# Patient Record
Sex: MALE | Race: WHITE | NOT HISPANIC OR LATINO | Employment: FULL TIME | URBAN - METROPOLITAN AREA
[De-identification: names, ages, dates, MRNs, and addresses within clinical notes are randomized per-mention and may not be internally consistent; named-entity substitution may affect disease eponyms.]

---

## 2017-07-22 ENCOUNTER — APPOINTMENT (OUTPATIENT)
Dept: LAB | Age: 22
End: 2017-07-22
Attending: PREVENTIVE MEDICINE

## 2017-07-22 ENCOUNTER — TRANSCRIBE ORDERS (OUTPATIENT)
Dept: ADMINISTRATIVE | Age: 22
End: 2017-07-22

## 2017-07-22 DIAGNOSIS — Z00.8 HEALTH EXAMINATION IN POPULATION SURVEY: ICD-10-CM

## 2017-07-22 DIAGNOSIS — Z00.8 HEALTH EXAMINATION IN POPULATION SURVEY: Primary | ICD-10-CM

## 2017-07-22 PROCEDURE — 86480 TB TEST CELL IMMUN MEASURE: CPT

## 2017-07-22 PROCEDURE — 36415 COLL VENOUS BLD VENIPUNCTURE: CPT

## 2017-07-25 LAB
ANNOTATION COMMENT IMP: NORMAL
GAMMA INTERFERON BACKGROUND BLD IA-ACNC: 0.04 IU/ML
M TB IFN-G BLD-IMP: NEGATIVE
M TB IFN-G CD4+ BCKGRND COR BLD-ACNC: 0 IU/ML
M TB IFN-G CD4+ T-CELLS BLD-ACNC: 0.04 IU/ML
MITOGEN IGNF BLD-ACNC: 9.4 IU/ML
QUANTIFERON-TB GOLD IN TUBE: NORMAL
SERVICE CMNT-IMP: NORMAL

## 2021-01-09 ENCOUNTER — IMMUNIZATIONS (OUTPATIENT)
Dept: FAMILY MEDICINE CLINIC | Facility: HOSPITAL | Age: 26
End: 2021-01-09

## 2021-01-09 DIAGNOSIS — Z23 ENCOUNTER FOR IMMUNIZATION: ICD-10-CM

## 2021-01-09 PROCEDURE — 0011A SARS-COV-2 / COVID-19 MRNA VACCINE (MODERNA) 100 MCG: CPT

## 2021-01-09 PROCEDURE — 91301 SARS-COV-2 / COVID-19 MRNA VACCINE (MODERNA) 100 MCG: CPT

## 2021-02-04 ENCOUNTER — IMMUNIZATIONS (OUTPATIENT)
Dept: FAMILY MEDICINE CLINIC | Facility: HOSPITAL | Age: 26
End: 2021-02-04

## 2021-02-04 DIAGNOSIS — Z23 ENCOUNTER FOR IMMUNIZATION: Primary | ICD-10-CM

## 2021-02-04 PROCEDURE — 0012A SARS-COV-2 / COVID-19 MRNA VACCINE (MODERNA) 100 MCG: CPT

## 2021-02-04 PROCEDURE — 91301 SARS-COV-2 / COVID-19 MRNA VACCINE (MODERNA) 100 MCG: CPT

## 2021-02-16 ENCOUNTER — TELEMEDICINE (OUTPATIENT)
Dept: FAMILY MEDICINE CLINIC | Facility: CLINIC | Age: 26
End: 2021-02-16
Payer: COMMERCIAL

## 2021-02-16 DIAGNOSIS — Z13.220 SCREENING FOR HYPERLIPIDEMIA: ICD-10-CM

## 2021-02-16 DIAGNOSIS — K58.9 IRRITABLE BOWEL SYNDROME, UNSPECIFIED TYPE: ICD-10-CM

## 2021-02-16 DIAGNOSIS — R11.0 NAUSEA: ICD-10-CM

## 2021-02-16 DIAGNOSIS — R10.13 EPIGASTRIC PAIN: Primary | ICD-10-CM

## 2021-02-16 DIAGNOSIS — R10.84 GENERALIZED ABDOMINAL PAIN: ICD-10-CM

## 2021-02-16 PROCEDURE — 99214 OFFICE O/P EST MOD 30 MIN: CPT | Performed by: FAMILY MEDICINE

## 2021-02-16 NOTE — PATIENT INSTRUCTIONS
Here for nausea and mid abdominal to epigastric abdominal pain, check labs and rec also consult with GI for possible IBS  Rec calling if fever or increasing abdominal pain  Await results

## 2021-02-16 NOTE — PROGRESS NOTES
Virtual Regular Visit      Assessment/Plan:    Problem List Items Addressed This Visit     None      Visit Diagnoses     Epigastric pain    -  Primary    Relevant Orders    Comprehensive metabolic panel    CBC and differential    Ambulatory referral to Gastroenterology    Celiac Disease Antibody Profile    Amylase    Lipase    UA w Reflex to Microscopic w Reflex to Culture -Lab Collect    Generalized abdominal pain        Relevant Orders    Comprehensive metabolic panel    CBC and differential    Ambulatory referral to Gastroenterology    Celiac Disease Antibody Profile    Amylase    Lipase    UA w Reflex to Microscopic w Reflex to Culture -Lab Collect    Irritable bowel syndrome, unspecified type        Relevant Orders    Comprehensive metabolic panel    CBC and differential    Ambulatory referral to Gastroenterology    Celiac Disease Antibody Profile    Amylase    Lipase    UA w Reflex to Microscopic w Reflex to Culture -Lab Collect    Nausea        Relevant Orders    Comprehensive metabolic panel    CBC and differential    Ambulatory referral to Gastroenterology    Celiac Disease Antibody Profile    Amylase    Lipase    UA w Reflex to Microscopic w Reflex to Culture -Lab Collect    Screening for hyperlipidemia        Relevant Orders    Comprehensive metabolic panel    Lipid Panel with Direct LDL reflex               Reason for visit is   Chief Complaint   Patient presents with    Virtual Regular Visit        Encounter provider Kieran Valles DO    Provider located at [address] [place] [id number]      Recent Visits  No visits were found meeting these conditions     Showing recent visits within past 7 days and meeting all other requirements     Today's Visits  Date Type Provider Dept   02/16/21 Telemedicine Kieran Valles DO [facility] today's visits and meeting all other requirements     Future Appointments  No visits were found meeting these conditions  Showing future appointments within next 150 days and meeting all other requirements        The patient was identified by name and date of birth  Jaqueline Dominguez was informed that this is a telemedicine visit and that the visit is being conducted through [facility] and patient was informed that this is a secure, HIPAA-compliant platform  He agrees to proceed     My office door was closed  No one else was in the room  He acknowledged consent and understanding of privacy and security of the video platform  The patient has agreed to participate and understands they can discontinue the visit at any time  Patient is aware this is a billable service  Subjective  Jaqueline Dominguez is a 31 y o  male here for nausea and abdominal pain        TKJ/[phone] NP, Stomach pain/ concerns with being allergic to anything, works at [employer] and has no PCP and is an  at [employer]  Generally healthy and on no meds  Possible nausea into evening and hasn't found anything to cause it  Has hx of heart burn in past  Not frequent but has been occurring 3 times per week with nausea, no diarrhea and has no blood in stools  No vomiting  Has some stress at work and just got a dog which can add stress  No fever or pain radiating to back  He drinks a bottle of wine in a month  No other complaints  No past medical history on file  No past surgical history on file  No current outpatient medications on file  No current facility-administered medications for this visit  Not on File    Review of Systems   Constitutional: Negative  Negative for fever  HENT: Negative  Eyes: Negative  Cardiovascular: Negative  Gastrointestinal: Positive for abdominal pain and nausea  Negative for blood in stool, constipation, diarrhea and vomiting  Endocrine: Negative  Genitourinary: Negative  Musculoskeletal: Negative  Skin: Negative      Allergic/Immunologic: Negative  Neurological: Negative  Hematological: Negative  Psychiatric/Behavioral: Negative  Video Exam    There were no vitals filed for this visit  Physical Exam  Constitutional:       Appearance: Normal appearance  HENT:      Head: Normocephalic and atraumatic  Eyes:      Conjunctiva/sclera: Conjunctivae normal    Pulmonary:      Effort: Pulmonary effort is normal  No respiratory distress  Skin:     Coloration: Skin is not pale  Neurological:      General: No focal deficit present  Mental Status: He is alert and oriented to person, place, and time  Psychiatric:         Mood and Affect: Mood normal          Behavior: Behavior normal          Thought Content: Thought content normal          Judgment: Judgment normal           I spent 30 minutes directly with the patient during this visit    Patient Instructions   Here for nausea and mid abdominal to epigastric abdominal pain, check labs and rec also consult with GI for possible IBS  Rec calling if fever or increasing abdominal pain  Await results  VIRTUAL VISIT DISCLAIMER    Natividad Pak acknowledges that he has consented to an online visit or consultation  He understands that the online visit is based solely on information provided by him, and that, in the absence of a face-to-face physical evaluation by the physician, the diagnosis he receives is both limited and provisional in terms of accuracy and completeness  This is not intended to replace a full medical face-to-face evaluation by the physician  Natividad Pak understands and accepts these terms

## 2021-03-02 ENCOUNTER — LAB (OUTPATIENT)
Dept: LAB | Facility: HOSPITAL | Age: 26
End: 2021-03-02
Payer: COMMERCIAL

## 2021-03-02 DIAGNOSIS — R11.0 NAUSEA: ICD-10-CM

## 2021-03-02 DIAGNOSIS — R10.13 EPIGASTRIC PAIN: ICD-10-CM

## 2021-03-02 DIAGNOSIS — R10.84 GENERALIZED ABDOMINAL PAIN: ICD-10-CM

## 2021-03-02 DIAGNOSIS — K58.9 IRRITABLE BOWEL SYNDROME, UNSPECIFIED TYPE: ICD-10-CM

## 2021-03-02 DIAGNOSIS — Z13.220 SCREENING FOR HYPERLIPIDEMIA: ICD-10-CM

## 2021-03-02 LAB
ALBUMIN SERPL BCP-MCNC: 4 G/DL (ref 3.5–5)
ALP SERPL-CCNC: 53 U/L (ref 46–116)
ALT SERPL W P-5'-P-CCNC: 38 U/L (ref 12–78)
AMYLASE SERPL-CCNC: 53 IU/L (ref 25–115)
ANION GAP SERPL CALCULATED.3IONS-SCNC: 7 MMOL/L (ref 4–13)
AST SERPL W P-5'-P-CCNC: 22 U/L (ref 5–45)
BASOPHILS # BLD AUTO: 0.04 THOUSANDS/ΜL (ref 0–0.1)
BASOPHILS NFR BLD AUTO: 1 % (ref 0–1)
BILIRUB SERPL-MCNC: 0.49 MG/DL (ref 0.2–1)
BUN SERPL-MCNC: 15 MG/DL (ref 5–25)
CALCIUM SERPL-MCNC: 9.1 MG/DL (ref 8.3–10.1)
CHLORIDE SERPL-SCNC: 104 MMOL/L (ref 100–108)
CO2 SERPL-SCNC: 25 MMOL/L (ref 21–32)
CREAT SERPL-MCNC: 0.91 MG/DL (ref 0.6–1.3)
EOSINOPHIL # BLD AUTO: 0.21 THOUSAND/ΜL (ref 0–0.61)
EOSINOPHIL NFR BLD AUTO: 3 % (ref 0–6)
ERYTHROCYTE [DISTWIDTH] IN BLOOD BY AUTOMATED COUNT: 12.7 % (ref 11.6–15.1)
GFR SERPL CREATININE-BSD FRML MDRD: 116 ML/MIN/1.73SQ M
GLUCOSE SERPL-MCNC: 95 MG/DL (ref 65–140)
HCT VFR BLD AUTO: 46.1 % (ref 36.5–49.3)
HGB BLD-MCNC: 15.4 G/DL (ref 12–17)
IMM GRANULOCYTES # BLD AUTO: 0.01 THOUSAND/UL (ref 0–0.2)
IMM GRANULOCYTES NFR BLD AUTO: 0 % (ref 0–2)
LIPASE SERPL-CCNC: 86 U/L (ref 73–393)
LYMPHOCYTES # BLD AUTO: 1.95 THOUSANDS/ΜL (ref 0.6–4.47)
LYMPHOCYTES NFR BLD AUTO: 31 % (ref 14–44)
MCH RBC QN AUTO: 30.4 PG (ref 26.8–34.3)
MCHC RBC AUTO-ENTMCNC: 33.4 G/DL (ref 31.4–37.4)
MCV RBC AUTO: 91 FL (ref 82–98)
MONOCYTES # BLD AUTO: 0.44 THOUSAND/ΜL (ref 0.17–1.22)
MONOCYTES NFR BLD AUTO: 7 % (ref 4–12)
NEUTROPHILS # BLD AUTO: 3.66 THOUSANDS/ΜL (ref 1.85–7.62)
NEUTS SEG NFR BLD AUTO: 58 % (ref 43–75)
NRBC BLD AUTO-RTO: 0 /100 WBCS
PLATELET # BLD AUTO: 246 THOUSANDS/UL (ref 149–390)
PMV BLD AUTO: 9.4 FL (ref 8.9–12.7)
POTASSIUM SERPL-SCNC: 4 MMOL/L (ref 3.5–5.3)
PROT SERPL-MCNC: 7.4 G/DL (ref 6.4–8.2)
RBC # BLD AUTO: 5.06 MILLION/UL (ref 3.88–5.62)
SODIUM SERPL-SCNC: 136 MMOL/L (ref 136–145)
WBC # BLD AUTO: 6.31 THOUSAND/UL (ref 4.31–10.16)

## 2021-03-02 PROCEDURE — 86255 FLUORESCENT ANTIBODY SCREEN: CPT

## 2021-03-02 PROCEDURE — 83516 IMMUNOASSAY NONANTIBODY: CPT

## 2021-03-02 PROCEDURE — 82784 ASSAY IGA/IGD/IGG/IGM EACH: CPT

## 2021-03-02 PROCEDURE — 82150 ASSAY OF AMYLASE: CPT

## 2021-03-02 PROCEDURE — 83690 ASSAY OF LIPASE: CPT

## 2021-03-02 PROCEDURE — 36415 COLL VENOUS BLD VENIPUNCTURE: CPT

## 2021-03-02 PROCEDURE — 80053 COMPREHEN METABOLIC PANEL: CPT

## 2021-03-02 PROCEDURE — 85025 COMPLETE CBC W/AUTO DIFF WBC: CPT

## 2021-03-03 ENCOUNTER — APPOINTMENT (OUTPATIENT)
Dept: LAB | Facility: HOSPITAL | Age: 26
End: 2021-03-03
Payer: COMMERCIAL

## 2021-03-03 LAB
BILIRUB UR QL STRIP: NEGATIVE
CHOLEST SERPL-MCNC: 207 MG/DL (ref 50–200)
CLARITY UR: CLEAR
COLOR UR: YELLOW
ENDOMYSIUM IGA SER QL: NEGATIVE
GLIADIN PEPTIDE IGA SER-ACNC: 6 UNITS (ref 0–19)
GLIADIN PEPTIDE IGG SER-ACNC: 3 UNITS (ref 0–19)
GLUCOSE UR STRIP-MCNC: NEGATIVE MG/DL
HDLC SERPL-MCNC: 44 MG/DL
HGB UR QL STRIP.AUTO: NEGATIVE
IGA SERPL-MCNC: 272 MG/DL (ref 90–386)
KETONES UR STRIP-MCNC: NEGATIVE MG/DL
LDLC SERPL CALC-MCNC: 143 MG/DL (ref 0–100)
LEUKOCYTE ESTERASE UR QL STRIP: NEGATIVE
NITRITE UR QL STRIP: NEGATIVE
PH UR STRIP.AUTO: 6.5 [PH]
PROT UR STRIP-MCNC: NEGATIVE MG/DL
SP GR UR STRIP.AUTO: 1.01 (ref 1–1.03)
TRIGL SERPL-MCNC: 100 MG/DL
TTG IGA SER-ACNC: <2 U/ML (ref 0–3)
TTG IGG SER-ACNC: 8 U/ML (ref 0–5)
UROBILINOGEN UR QL STRIP.AUTO: 0.2 E.U./DL

## 2021-03-03 PROCEDURE — 80061 LIPID PANEL: CPT

## 2021-03-03 PROCEDURE — 36415 COLL VENOUS BLD VENIPUNCTURE: CPT

## 2021-03-03 PROCEDURE — 81003 URINALYSIS AUTO W/O SCOPE: CPT | Performed by: FAMILY MEDICINE

## 2021-03-29 ENCOUNTER — CONSULT (OUTPATIENT)
Dept: GASTROENTEROLOGY | Facility: CLINIC | Age: 26
End: 2021-03-29
Payer: COMMERCIAL

## 2021-03-29 VITALS
TEMPERATURE: 98.7 F | BODY MASS INDEX: 25.84 KG/M2 | SYSTOLIC BLOOD PRESSURE: 128 MMHG | WEIGHT: 195 LBS | HEART RATE: 89 BPM | HEIGHT: 73 IN | DIASTOLIC BLOOD PRESSURE: 90 MMHG

## 2021-03-29 DIAGNOSIS — R10.84 GENERALIZED ABDOMINAL PAIN: ICD-10-CM

## 2021-03-29 DIAGNOSIS — R10.13 EPIGASTRIC PAIN: ICD-10-CM

## 2021-03-29 DIAGNOSIS — R11.0 NAUSEA: ICD-10-CM

## 2021-03-29 DIAGNOSIS — K58.9 IRRITABLE BOWEL SYNDROME, UNSPECIFIED TYPE: ICD-10-CM

## 2021-03-29 PROCEDURE — 99204 OFFICE O/P NEW MOD 45 MIN: CPT | Performed by: PHYSICIAN ASSISTANT

## 2021-03-29 RX ORDER — CHLORAL HYDRATE 500 MG
CAPSULE ORAL
COMMUNITY
End: 2022-02-18

## 2021-03-29 NOTE — PROGRESS NOTES
[facility] - Outpatient Follow-up Note  Brooke Waller 31 y o  male MRN: [id number]  Encounter: [id number]          ASSESSMENT AND PLAN:      Diagnoses and all orders for this visit:    1  Nausea - resolved  -     Ambulatory referral to Gastroenterology  2  Irritable bowel syndrome, unspecified type  3  Generalized abdominal pain  -     Ambulatory referral to Gastroenterology    Patient presenting with mild symptoms of nausea and occasional diarrhea which have improved with adequate hydration and change to a higher fiber diet  No red flag symptoms of unintentional weight loss, melena or hematochezia  No family history of GI malignancy  His celiac antibody profile showed elevated TTGA IgG but normal IGA and TTGA IgA which would be diagnostic for celiac  Discussed the option of an EGD for definitive rule out of celiac disease, but suspect this will be negative given his labs  For residual symptoms of occasional diarrhea, discussed lower FODMAP diet and the option to add a daily probiotic  If symptoms persist, or worsen, could consider endoscopic workup in the future  Will plan for conservative management for now  Patient is in agreement with this plan and all questions were answered  ______________________________________________________________________    SUBJECTIVE:  Brooke Waller is a 31year old male with no significant medical history who presents to the GI office on referral from his PCP for nausea and abdominal pain  He states that these symptoms have been present on-and-off for several months  He has made some changes to his diet from a higher-protein diet to including high fiber foods and drinking a gallon of water daily  His symptoms have improved with this  He notes that he will occasionally still feel some nausea and lower abdominal discomfort after eating at restaurants  At times, triggers cannot be easily identified  He is consuming less dairy    He drinks alcohol socially (1-2 drinks less than 1x per week)  He is a non-smoker  He denies melena or hematochezia  He has lost 25-30lbs intentionally over the past 1 year with diet and exercise changes  No family history of colon cancer, though his mother was recently diagnosed with uterine cancer - no other GI malignancies in his maternal family history  His father recently completed a colonoscopy and he reports this was normal     He denies symptoms of heartburn, regurgitation, dysphagia or vomiting  No fevers, chills, chest pain, cough or SOB  He works as an  for [employer]  Labs as ordered by his PCP showed normal CBC and CMP  Lipase and amylase also WNL  A celiac antibody profile was ordered  IGA was within normal range and TTGA IGA was normal   He had a mildly elevated IgG TTGA which is non-specific  REVIEW OF SYSTEMS IS OTHERWISE NEGATIVE  Historical Information   History reviewed  No pertinent past medical history  History reviewed  No pertinent surgical history  Social History   Social History     Substance and Sexual Activity   Alcohol Use Never    Frequency: Never     Social History     Substance and Sexual Activity   Drug Use Never     Social History     Tobacco Use   Smoking Status Never Smoker   Smokeless Tobacco Never Used     History reviewed  No pertinent family history  Meds/Allergies       Current Outpatient Medications:     Multiple Vitamins-Minerals (DAILY [facility] FORMULA PO)    Omega-3 1000 MG CAPS    No Known Allergies        Objective     Blood pressure 128/90, pulse 89, temperature 98 7 °F (37 1 °C), temperature source Tympanic, height 6' 1" (1 854 m), weight 88 5 kg (195 lb)  Body mass index is 25 73 kg/m²        PHYSICAL EXAM:      General Appearance:   Alert, cooperative, no distress   HEENT:   Normocephalic, atraumatic, sclera anicteric      Neck:  Supple, symmetrical, no lymphadenopathy, trachea midline   Lungs:   Clear to auscultation bilaterally; no rales, rhonchi or wheezing; respirations unlabored    Heart[de-identified]   Regular rate and rhythm; no murmur, rub, or gallop     Abdomen:   Soft, non-tender without rebound or guarding, non-distended; positive bowel sounds; no masses, no organomegaly    Genitalia:   Deferred    Rectal:   Deferred    Extremities:  No cyanosis, clubbing or edema    Pulses:  2+ and symmetric    Skin:  No jaundice, rashes, or lesions            Wendy Soler PA-C

## 2021-10-06 ENCOUNTER — OFFICE VISIT (OUTPATIENT)
Dept: FAMILY MEDICINE CLINIC | Facility: CLINIC | Age: 26
End: 2021-10-06
Payer: COMMERCIAL

## 2021-10-06 VITALS
SYSTOLIC BLOOD PRESSURE: 118 MMHG | WEIGHT: 196 LBS | DIASTOLIC BLOOD PRESSURE: 72 MMHG | BODY MASS INDEX: 25.98 KG/M2 | TEMPERATURE: 96.7 F | HEART RATE: 66 BPM | HEIGHT: 73 IN | OXYGEN SATURATION: 98 %

## 2021-10-06 DIAGNOSIS — F41.9 ANXIETY: ICD-10-CM

## 2021-10-06 DIAGNOSIS — E78.5 HYPERLIPIDEMIA, UNSPECIFIED HYPERLIPIDEMIA TYPE: ICD-10-CM

## 2021-10-06 DIAGNOSIS — E66.3 OVERWEIGHT (BMI 25.0-29.9): ICD-10-CM

## 2021-10-06 DIAGNOSIS — Z00.00 HEALTH CARE MAINTENANCE: Primary | ICD-10-CM

## 2021-10-06 DIAGNOSIS — Z13.220 SCREENING FOR HYPERLIPIDEMIA: ICD-10-CM

## 2021-10-06 PROCEDURE — 99395 PREV VISIT EST AGE 18-39: CPT | Performed by: FAMILY MEDICINE

## 2021-10-06 RX ORDER — SERTRALINE HYDROCHLORIDE 25 MG/1
25 TABLET, FILM COATED ORAL DAILY
Qty: 7 TABLET | Refills: 0 | Status: SHIPPED | OUTPATIENT
Start: 2021-10-06 | End: 2021-11-17

## 2021-10-07 ENCOUNTER — APPOINTMENT (OUTPATIENT)
Dept: LAB | Facility: HOSPITAL | Age: 26
End: 2021-10-07
Payer: COMMERCIAL

## 2021-10-07 DIAGNOSIS — E78.5 HYPERLIPIDEMIA, UNSPECIFIED HYPERLIPIDEMIA TYPE: ICD-10-CM

## 2021-10-07 DIAGNOSIS — E66.3 OVERWEIGHT (BMI 25.0-29.9): ICD-10-CM

## 2021-10-07 DIAGNOSIS — Z00.00 HEALTH CARE MAINTENANCE: ICD-10-CM

## 2021-10-07 DIAGNOSIS — Z13.220 SCREENING FOR HYPERLIPIDEMIA: ICD-10-CM

## 2021-10-07 DIAGNOSIS — F41.9 ANXIETY: ICD-10-CM

## 2021-10-07 LAB
ALBUMIN SERPL BCP-MCNC: 3.9 G/DL (ref 3.5–5)
ALP SERPL-CCNC: 47 U/L (ref 46–116)
ALT SERPL W P-5'-P-CCNC: 33 U/L (ref 12–78)
ANION GAP SERPL CALCULATED.3IONS-SCNC: 3 MMOL/L (ref 4–13)
AST SERPL W P-5'-P-CCNC: 16 U/L (ref 5–45)
BASOPHILS # BLD AUTO: 0.04 THOUSANDS/ΜL (ref 0–0.1)
BASOPHILS NFR BLD AUTO: 1 % (ref 0–1)
BILIRUB SERPL-MCNC: 0.52 MG/DL (ref 0.2–1)
BUN SERPL-MCNC: 15 MG/DL (ref 5–25)
CALCIUM SERPL-MCNC: 9 MG/DL (ref 8.3–10.1)
CHLORIDE SERPL-SCNC: 105 MMOL/L (ref 100–108)
CHOLEST SERPL-MCNC: 189 MG/DL (ref 50–200)
CO2 SERPL-SCNC: 28 MMOL/L (ref 21–32)
CREAT SERPL-MCNC: 0.94 MG/DL (ref 0.6–1.3)
EOSINOPHIL # BLD AUTO: 0.21 THOUSAND/ΜL (ref 0–0.61)
EOSINOPHIL NFR BLD AUTO: 3 % (ref 0–6)
ERYTHROCYTE [DISTWIDTH] IN BLOOD BY AUTOMATED COUNT: 12.2 % (ref 11.6–15.1)
GFR SERPL CREATININE-BSD FRML MDRD: 111 ML/MIN/1.73SQ M
GLUCOSE P FAST SERPL-MCNC: 83 MG/DL (ref 65–99)
HCT VFR BLD AUTO: 47.7 % (ref 36.5–49.3)
HDLC SERPL-MCNC: 42 MG/DL
HGB BLD-MCNC: 15.4 G/DL (ref 12–17)
IMM GRANULOCYTES # BLD AUTO: 0.01 THOUSAND/UL (ref 0–0.2)
IMM GRANULOCYTES NFR BLD AUTO: 0 % (ref 0–2)
LDLC SERPL CALC-MCNC: 128 MG/DL (ref 0–100)
LYMPHOCYTES # BLD AUTO: 1.65 THOUSANDS/ΜL (ref 0.6–4.47)
LYMPHOCYTES NFR BLD AUTO: 25 % (ref 14–44)
MCH RBC QN AUTO: 29.7 PG (ref 26.8–34.3)
MCHC RBC AUTO-ENTMCNC: 32.3 G/DL (ref 31.4–37.4)
MCV RBC AUTO: 92 FL (ref 82–98)
MONOCYTES # BLD AUTO: 0.53 THOUSAND/ΜL (ref 0.17–1.22)
MONOCYTES NFR BLD AUTO: 8 % (ref 4–12)
NEUTROPHILS # BLD AUTO: 4.21 THOUSANDS/ΜL (ref 1.85–7.62)
NEUTS SEG NFR BLD AUTO: 63 % (ref 43–75)
NRBC BLD AUTO-RTO: 0 /100 WBCS
PLATELET # BLD AUTO: 231 THOUSANDS/UL (ref 149–390)
PMV BLD AUTO: 9.9 FL (ref 8.9–12.7)
POTASSIUM SERPL-SCNC: 4.1 MMOL/L (ref 3.5–5.3)
PROT SERPL-MCNC: 7.1 G/DL (ref 6.4–8.2)
RBC # BLD AUTO: 5.19 MILLION/UL (ref 3.88–5.62)
SODIUM SERPL-SCNC: 136 MMOL/L (ref 136–145)
T4 FREE SERPL-MCNC: 0.96 NG/DL (ref 0.76–1.46)
TRIGL SERPL-MCNC: 96 MG/DL
TSH SERPL DL<=0.05 MIU/L-ACNC: 1.36 UIU/ML (ref 0.36–3.74)
WBC # BLD AUTO: 6.65 THOUSAND/UL (ref 4.31–10.16)

## 2021-10-07 PROCEDURE — 84443 ASSAY THYROID STIM HORMONE: CPT

## 2021-10-07 PROCEDURE — 80061 LIPID PANEL: CPT

## 2021-10-07 PROCEDURE — 85025 COMPLETE CBC W/AUTO DIFF WBC: CPT

## 2021-10-07 PROCEDURE — 36415 COLL VENOUS BLD VENIPUNCTURE: CPT

## 2021-10-07 PROCEDURE — 84439 ASSAY OF FREE THYROXINE: CPT | Performed by: FAMILY MEDICINE

## 2021-10-07 PROCEDURE — 80053 COMPREHEN METABOLIC PANEL: CPT

## 2021-11-17 ENCOUNTER — OFFICE VISIT (OUTPATIENT)
Dept: FAMILY MEDICINE CLINIC | Facility: CLINIC | Age: 26
End: 2021-11-17
Payer: COMMERCIAL

## 2021-11-17 VITALS
DIASTOLIC BLOOD PRESSURE: 82 MMHG | SYSTOLIC BLOOD PRESSURE: 124 MMHG | HEART RATE: 72 BPM | BODY MASS INDEX: 26.77 KG/M2 | OXYGEN SATURATION: 98 % | HEIGHT: 73 IN | WEIGHT: 202 LBS | TEMPERATURE: 97.1 F

## 2021-11-17 DIAGNOSIS — F41.9 ANXIETY: Primary | ICD-10-CM

## 2021-11-17 PROCEDURE — 99213 OFFICE O/P EST LOW 20 MIN: CPT | Performed by: FAMILY MEDICINE

## 2021-12-14 ENCOUNTER — IMMUNIZATIONS (OUTPATIENT)
Dept: FAMILY MEDICINE CLINIC | Facility: HOSPITAL | Age: 26
End: 2021-12-14

## 2021-12-14 DIAGNOSIS — Z23 ENCOUNTER FOR IMMUNIZATION: Primary | ICD-10-CM

## 2021-12-14 PROCEDURE — 91306 COVID-19 MODERNA VACC 0.25 ML BOOSTER: CPT

## 2021-12-14 PROCEDURE — 0064A COVID-19 MODERNA VACC 0.25 ML BOOSTER: CPT

## 2022-02-18 ENCOUNTER — OFFICE VISIT (OUTPATIENT)
Dept: FAMILY MEDICINE CLINIC | Facility: CLINIC | Age: 27
End: 2022-02-18
Payer: COMMERCIAL

## 2022-02-18 VITALS
BODY MASS INDEX: 26.96 KG/M2 | WEIGHT: 203.4 LBS | HEART RATE: 57 BPM | DIASTOLIC BLOOD PRESSURE: 78 MMHG | SYSTOLIC BLOOD PRESSURE: 110 MMHG | TEMPERATURE: 97.2 F | HEIGHT: 73 IN | OXYGEN SATURATION: 97 %

## 2022-02-18 DIAGNOSIS — F41.9 ANXIETY: Primary | ICD-10-CM

## 2022-02-18 PROCEDURE — 99213 OFFICE O/P EST LOW 20 MIN: CPT | Performed by: FAMILY MEDICINE

## 2022-02-18 NOTE — PROGRESS NOTES
Assessment/Plan:  Chief Complaint   Patient presents with    Follow-up     Patient Instructions   Here for anxiety recheck  Sertraline helps  Continue Sertraline 50 mg daily and recheck in 6 months  Eat healthy and exercise  No problem-specific Assessment & Plan notes found for this encounter  Diagnoses and all orders for this visit:    Anxiety          Subjective:      Patient ID: Terrance Ngo is a 31 y o  male  Here for recheck on anxiety and sertraline 50 mg helps  No side effects  No other concerns  Anxiety stable on Sertraline 50 mg daily  Compliant with med  The following portions of the patient's history were reviewed and updated as appropriate: allergies, current medications, past family history, past medical history, past social history, past surgical history and problem list     Review of Systems   Constitutional: Negative  HENT: Negative  Eyes: Negative  Respiratory: Negative  Cardiovascular: Negative  Gastrointestinal: Negative  Endocrine: Negative  Genitourinary: Negative  Musculoskeletal: Negative  Skin: Negative  Allergic/Immunologic: Negative  Neurological: Negative  Hematological: Negative  Psychiatric/Behavioral:        Anxiety stable         Objective:      /78   Pulse 57   Temp (!) 97 2 °F (36 2 °C) (Temporal)   Ht 6' 0 75" (1 848 m)   Wt 92 3 kg (203 lb 6 4 oz)   SpO2 97%   BMI 27 02 kg/m²          Physical Exam  Constitutional:       Appearance: He is well-developed  HENT:      Head: Normocephalic and atraumatic  Eyes:      Conjunctiva/sclera: Conjunctivae normal       Pupils: Pupils are equal, round, and reactive to light  Cardiovascular:      Rate and Rhythm: Normal rate and regular rhythm  Heart sounds: Normal heart sounds  Pulmonary:      Effort: Pulmonary effort is normal       Breath sounds: Normal breath sounds  Musculoskeletal:         General: Normal range of motion        Cervical back: Normal range of motion and neck supple  Skin:     General: Skin is warm and dry  Neurological:      Mental Status: He is alert and oriented to person, place, and time  Deep Tendon Reflexes: Reflexes are normal and symmetric     Psychiatric:         Mood and Affect: Mood normal          Behavior: Behavior normal       Comments: Anxiety stable on med

## 2022-02-18 NOTE — PATIENT INSTRUCTIONS
Here for anxiety recheck  Sertraline helps  Continue Sertraline 50 mg daily and recheck in 6 months  Eat healthy and exercise

## 2022-04-20 DIAGNOSIS — F41.9 ANXIETY: ICD-10-CM

## 2022-12-28 ENCOUNTER — OFFICE VISIT (OUTPATIENT)
Dept: URGENT CARE | Age: 27
End: 2022-12-28

## 2022-12-28 VITALS
TEMPERATURE: 97.7 F | DIASTOLIC BLOOD PRESSURE: 80 MMHG | SYSTOLIC BLOOD PRESSURE: 122 MMHG | HEART RATE: 72 BPM | OXYGEN SATURATION: 99 % | RESPIRATION RATE: 18 BRPM

## 2022-12-28 DIAGNOSIS — F41.9 ANXIETY: Primary | ICD-10-CM

## 2022-12-28 RX ORDER — HYDROXYZINE HYDROCHLORIDE 25 MG/1
25 TABLET, FILM COATED ORAL EVERY 6 HOURS PRN
Qty: 15 TABLET | Refills: 0 | Status: SHIPPED | OUTPATIENT
Start: 2022-12-28

## 2022-12-28 NOTE — PROGRESS NOTES
[facility]        NAME: Zachary Jorge is a 28 y o  male  : 1995    MRN: [id number]  DATE: 2022  TIME: 11:39 AM    Assessment and Plan   Anxiety [F41 9]  1  Anxiety  hydrOXYzine HCL (ATARAX) 25 mg tablet            Patient Instructions   Hydroxyzine 1 tablet every 6 hours as needed for anxiety  This medication is for home use only as it can make you tired  Follow-up with your primary care provider as scheduled next week  Attempt to increase fluid intake  Follow up with PCP in 3-5 days  Proceed to  ER if symptoms worsen  Chief Complaint     Chief Complaint   Patient presents with   • Abdominal Pain     Patient c/o of mid abdominal pain since [place]- no diarrhea, vomited yesterday 3 times none today- he states that he feels anxious since [place]         History of Present Illness       Patient is a 79-year-old male presenting with 4 days of abdominal pain  He states a history of the same due to his anxiety  It causes nausea and poor appetite  He did have some vomiting due to the abdominal pain  He is tolerating p o  fluid intake but is unable to tolerate solids  He believes traveling for [place] provoked his anxiety  He is on sertraline and has a follow-up appointment next week with his primary care provider  Review of Systems   Review of Systems   Constitutional: Negative for activity change, chills and fever  Respiratory: Negative for cough  Gastrointestinal: Positive for abdominal pain, nausea and vomiting  Negative for diarrhea  Psychiatric/Behavioral: The patient is nervous/anxious            Current Medications       Current Outpatient Medications:   •  hydrOXYzine HCL (ATARAX) 25 mg tablet, Take 1 tablet (25 mg total) by mouth every 6 (six) hours as needed for anxiety, Disp: 15 tablet, Rfl: 0  •  sertraline (Zoloft) 50 mg tablet, Take 1 tablet (50 mg total) by mouth daily, Disp: 30 tablet, Rfl: 5    Current Allergies     Allergies as of 12/28/2022 - Reviewed 12/28/2022   Allergen Reaction Noted   • Penicillins Rash and Other (See Comments) 12/28/2022            The following portions of the patient's history were reviewed and updated as appropriate: allergies, current medications, past family history, past medical history, past social history, past surgical history and problem list      No past medical history on file  No past surgical history on file  No family history on file  Medications have been verified  Objective   /80 (BP Location: Right arm, Patient Position: Sitting, Cuff Size: Standard)   Pulse 72   Temp 97 7 °F (36 5 °C)   Resp 18   SpO2 99%        Physical Exam     Physical Exam  Vitals reviewed  Constitutional:       General: He is awake  He is not in acute distress  Appearance: He is well-developed and normal weight  Cardiovascular:      Rate and Rhythm: Normal rate  Pulmonary:      Effort: Pulmonary effort is normal    Skin:     General: Skin is warm and moist    Neurological:      General: No focal deficit present  Mental Status: He is alert and oriented to person, place, and time  Psychiatric:         Behavior: Behavior is cooperative

## 2023-01-04 ENCOUNTER — OFFICE VISIT (OUTPATIENT)
Dept: FAMILY MEDICINE CLINIC | Facility: CLINIC | Age: 28
End: 2023-01-04

## 2023-01-04 VITALS
RESPIRATION RATE: 18 BRPM | SYSTOLIC BLOOD PRESSURE: 120 MMHG | HEIGHT: 72 IN | BODY MASS INDEX: 24.41 KG/M2 | DIASTOLIC BLOOD PRESSURE: 80 MMHG | HEART RATE: 67 BPM | WEIGHT: 180.2 LBS | OXYGEN SATURATION: 98 % | TEMPERATURE: 97.3 F

## 2023-01-04 DIAGNOSIS — K29.70 GASTRITIS, PRESENCE OF BLEEDING UNSPECIFIED, UNSPECIFIED CHRONICITY, UNSPECIFIED GASTRITIS TYPE: ICD-10-CM

## 2023-01-04 DIAGNOSIS — R10.9 ABDOMINAL PAIN, UNSPECIFIED ABDOMINAL LOCATION: ICD-10-CM

## 2023-01-04 DIAGNOSIS — F41.9 ANXIETY: Primary | ICD-10-CM

## 2023-01-04 DIAGNOSIS — R11.10 VOMITING, UNSPECIFIED VOMITING TYPE, UNSPECIFIED WHETHER NAUSEA PRESENT: ICD-10-CM

## 2023-01-04 DIAGNOSIS — E78.5 HYPERLIPIDEMIA, UNSPECIFIED HYPERLIPIDEMIA TYPE: ICD-10-CM

## 2023-01-04 DIAGNOSIS — K58.9 IRRITABLE BOWEL SYNDROME, UNSPECIFIED TYPE: ICD-10-CM

## 2023-01-04 RX ORDER — OMEPRAZOLE 20 MG/1
20 CAPSULE, DELAYED RELEASE ORAL
Qty: 30 CAPSULE | Refills: 0 | Status: SHIPPED | OUTPATIENT
Start: 2023-01-04

## 2023-01-04 NOTE — PATIENT INSTRUCTIONS
Here for recent stress when he travelled to [place]  Rec continuing Sertraline 50 mg daily for anxiety and also rec GI consult for evaluation of midabdominal and midepigastric pain and vomiting  Call if any rectal bleeding

## 2023-01-04 NOTE — PROGRESS NOTES
Name: Adeola Lloyd      : 1995      MRN: [id number]  Encounter Provider: Zaira Disla DO  Encounter Date: 2023   Encounter department: [address] [place] [address]  Chief Complaint   Patient presents with   • GI Problem     Pt was having stomach pain for 2 w but now the pain is gone    • Anxiety     Pt would like to discuss anxiety medication      Patient Instructions   Here for recent stress when he travelled to [place]  Rec continuing Sertraline 50 mg daily for anxiety and also rec GI consult for evaluation of midabdominal and midepigastric pain and vomiting  Call if any rectal bleeding  Assessment & Plan     1  Anxiety    2  Abdominal pain, unspecified abdominal location  -     Ambulatory Referral to Gastroenterology; Future  -     omeprazole (PriLOSEC) 20 mg delayed release capsule; Take 1 capsule (20 mg total) by mouth daily before breakfast    3  Hyperlipidemia, unspecified hyperlipidemia type    4  Irritable bowel syndrome, unspecified type  -     Ambulatory Referral to Gastroenterology; Future  -     omeprazole (PriLOSEC) 20 mg delayed release capsule; Take 1 capsule (20 mg total) by mouth daily before breakfast    5  Vomiting, unspecified vomiting type, unspecified whether nausea present  -     Ambulatory Referral to Gastroenterology; Future    6  Gastritis, presence of bleeding unspecified, unspecified chronicity, unspecified gastritis type  -     Ambulatory Referral to Gastroenterology; Future  -     omeprazole (PriLOSEC) 20 mg delayed release capsule; Take 1 capsule (20 mg total) by mouth daily before breakfast           Subjective      GI Problem (Pt was having stomach pain for 2 w but now the pain is gone )  Anxiety (Pt would like to discuss anxiety medication )    Has had increased stress when travelling to [place] to meet parents and sister's fiance and family  GI Problem  The primary symptoms include abdominal pain     Anxiety          Review of Systems Constitutional: Negative  HENT: Negative  Eyes: Negative  Respiratory: Negative  Cardiovascular: Negative  Gastrointestinal: Positive for abdominal pain  Endocrine: Negative  Genitourinary: Negative  Musculoskeletal: Negative  Skin: Negative  Allergic/Immunologic: Negative  Neurological: Negative  Hematological: Negative  Psychiatric/Behavioral:        Stress and anxiety       Current Outpatient Medications on File Prior to Visit   Medication Sig   • sertraline (Zoloft) 50 mg tablet Take 1 tablet (50 mg total) by mouth daily   • hydrOXYzine HCL (ATARAX) 25 mg tablet Take 1 tablet (25 mg total) by mouth every 6 (six) hours as needed for anxiety (Patient not taking: Reported on 1/4/2023)       Objective     /80 (BP Location: Left arm, Patient Position: Sitting, Cuff Size: Adult)   Pulse 67   Temp (!) 97 3 °F (36 3 °C) (Temporal)   Resp 18   Ht 6' (1 829 m)   Wt 81 7 kg (180 lb 3 2 oz)   SpO2 98%   BMI 24 44 kg/m²     Physical Exam  Constitutional:       Appearance: He is well-developed  HENT:      Head: Normocephalic and atraumatic  Right Ear: External ear normal       Left Ear: External ear normal       Nose: Nose normal    Eyes:      Conjunctiva/sclera: Conjunctivae normal       Pupils: Pupils are equal, round, and reactive to light  Cardiovascular:      Rate and Rhythm: Normal rate and regular rhythm  Heart sounds: Normal heart sounds  Pulmonary:      Effort: Pulmonary effort is normal       Breath sounds: Normal breath sounds  Abdominal:      General: Abdomen is flat  Bowel sounds are normal       Palpations: Abdomen is soft  Musculoskeletal:         General: Normal range of motion  Cervical back: Normal range of motion and neck supple  Skin:     General: Skin is warm and dry  Neurological:      General: No focal deficit present  Mental Status: He is alert and oriented to person, place, and time  Mental status is at baseline  Deep Tendon Reflexes: Reflexes are normal and symmetric  Psychiatric:         Mood and Affect: Mood normal          Behavior: Behavior normal          Thought Content:  Thought content normal          Judgment: Judgment normal       Comments: anxiety       Vasiliy Hernandez, DO

## 2023-01-06 DIAGNOSIS — F41.9 ANXIETY: ICD-10-CM

## 2023-01-20 ENCOUNTER — CONSULT (OUTPATIENT)
Dept: GASTROENTEROLOGY | Facility: CLINIC | Age: 28
End: 2023-01-20

## 2023-01-20 VITALS
SYSTOLIC BLOOD PRESSURE: 125 MMHG | TEMPERATURE: 98.6 F | HEART RATE: 77 BPM | HEIGHT: 72 IN | WEIGHT: 175 LBS | BODY MASS INDEX: 23.7 KG/M2 | DIASTOLIC BLOOD PRESSURE: 82 MMHG

## 2023-01-20 DIAGNOSIS — R10.9 ABDOMINAL PAIN, UNSPECIFIED ABDOMINAL LOCATION: ICD-10-CM

## 2023-01-20 DIAGNOSIS — K29.70 GASTRITIS, PRESENCE OF BLEEDING UNSPECIFIED, UNSPECIFIED CHRONICITY, UNSPECIFIED GASTRITIS TYPE: ICD-10-CM

## 2023-01-20 DIAGNOSIS — K58.9 IRRITABLE BOWEL SYNDROME, UNSPECIFIED TYPE: ICD-10-CM

## 2023-01-20 DIAGNOSIS — R11.10 VOMITING, UNSPECIFIED VOMITING TYPE, UNSPECIFIED WHETHER NAUSEA PRESENT: ICD-10-CM

## 2023-01-20 NOTE — PATIENT INSTRUCTIONS
Scheduled date of EGD(as of today): 3/2/23  Physician performing EGD: Dr Anurag Bah  Location of EGD: [facility]  Instructions reviewed  with patient by: Abdiaziz Guzmán   Clearances:    n/a

## 2023-01-20 NOTE — PROGRESS NOTES
[facility] - Outpatient Consultation  Gray Ford 28 y o  male MRN: [id number]  Encounter: [id number]          ASSESSMENT AND PLAN:      Ravi Sorto is a 29 y/o male with anxiety who presents for consultation  Of abdominal pain  1    2  Anxiety  TSH WNL  ______________________________________________________________________    HPI:  Ravi Sorto is a 29 y/o male with anxiety who presents for consultation  Of abdominal pain  REVIEW OF SYSTEMS:    CONSTITUTIONAL: Denies any fever, chills, rigors, and weight loss  HEENT: No earache or tinnitus  Denies hearing loss or visual disturbances  CARDIOVASCULAR: No chest pain or palpitations  RESPIRATORY: Denies any cough, hemoptysis, shortness of breath or dyspnea on exertion  GASTROINTESTINAL: As noted in the History of Present Illness  GENITOURINARY: No problems with urination  Denies any hematuria or dysuria  NEUROLOGIC: No dizziness or vertigo, denies headaches  MUSCULOSKELETAL: Denies any muscle or joint pain  SKIN: Denies skin rashes or itching  ENDOCRINE: Denies excessive thirst  Denies intolerance to heat or cold  PSYCHOSOCIAL: Denies depression or anxiety  Denies any recent memory loss  Historical Information   History reviewed  No pertinent past medical history  History reviewed  No pertinent surgical history  Social History   Social History     Substance and Sexual Activity   Alcohol Use Never     Social History     Substance and Sexual Activity   Drug Use Never     Social History     Tobacco Use   Smoking Status Never   Smokeless Tobacco Never     History reviewed  No pertinent family history      Meds/Allergies       Current Outpatient Medications:   •  sertraline (Zoloft) 50 mg tablet  •  hydrOXYzine HCL (ATARAX) 25 mg tablet  •  omeprazole (PriLOSEC) 20 mg delayed release capsule    Allergies   Allergen Reactions   • Penicillins Rash and Other (See Comments)     He thinks it may be a rash           Objective There were no vitals taken for this visit  There is no height or weight on file to calculate BMI  PHYSICAL EXAM:      General Appearance:   Alert, cooperative, no distress   HEENT:   Normocephalic, atraumatic, anicteric      Neck:  Supple, symmetrical, trachea midline   Lungs:   Clear to auscultation bilaterally; no rales, rhonchi or wheezing; respirations unlabored    Heart[de-identified]   Regular rate and rhythm; no murmur, rub, or gallop  Abdomen:   Soft, non-tender, non-distended; normal bowel sounds; no masses, no organomegaly    Genitalia:   Deferred    Rectal:   Deferred    Extremities:  No cyanosis, clubbing or edema    Pulses:  2+ and symmetric    Skin:  No jaundice, rashes, or lesions    Lymph nodes:  No palpable cervical lymphadenopathy        Lab Results:   No visits with results within 1 Day(s) from this visit     Latest known visit with results is:   Appointment on 10/07/2021   Component Date Value   • TSH 3RD GENERATON 10/07/2021 1 360    • Cholesterol 10/07/2021 189    • Triglycerides 10/07/2021 96    • HDL, Direct 10/07/2021 42    • LDL Calculated 10/07/2021 128 (H)    • Sodium 10/07/2021 136    • Potassium 10/07/2021 4 1    • Chloride 10/07/2021 105    • CO2 10/07/2021 28    • ANION GAP 10/07/2021 3 (L)    • BUN 10/07/2021 15    • Creatinine 10/07/2021 0 94    • Glucose, Fasting 10/07/2021 83    • Calcium 10/07/2021 9 0    • AST 10/07/2021 16    • ALT 10/07/2021 33    • Alkaline Phosphatase 10/07/2021 47    • Total Protein 10/07/2021 7 1    • Albumin 10/07/2021 3 9    • Total Bilirubin 10/07/2021 0 52    • eGFR 10/07/2021 111    • WBC 10/07/2021 6 65    • RBC 10/07/2021 5 19    • Hemoglobin 10/07/2021 15 4    • Hematocrit 10/07/2021 47 7    • MCV 10/07/2021 92    • MCH 10/07/2021 29 7    • MCHC 10/07/2021 32 3    • RDW 10/07/2021 12 2    • MPV 10/07/2021 9 9    • Platelets 93/07/1411 231    • nRBC 10/07/2021 0    • Neutrophils Relative 10/07/2021 63    • Immat GRANS % 10/07/2021 0    • Lymphocytes Relative 10/07/2021 25    • Monocytes Relative 10/07/2021 8    • Eosinophils Relative 10/07/2021 3    • Basophils Relative 10/07/2021 1    • Neutrophils Absolute 10/07/2021 4 21    • Immature Grans Absolute 10/07/2021 0 01    • Lymphocytes Absolute 10/07/2021 1 65    • Monocytes Absolute 10/07/2021 0 53    • Eosinophils Absolute 10/07/2021 0 21    • Basophils Absolute 10/07/2021 0 04          Radiology Results:   No results found

## 2023-01-20 NOTE — PROGRESS NOTES
[facility] - Outpatient Consultation  Blaze Avendano 28 y o  male MRN: [id number]  Encounter: [id number]          ASSESSMENT AND PLAN:      1  Anxiety  2  Dyspepsia  3  Elevated TT IGG  Pt says that since being on zoloft, his bouts of alternating bowel habits and periumbilical pain have resolved however he continues to get daily nausea at random times of the day with a "pressure" in his periumbilical area  He was under the impression that the zoloft would rid him of all his GI symptoms as they were "alwayus" connected with his anxiety but as his dyspepsia continues, he is worried and would like EGD done  Pt had mildly elevated TT IGG    -EGD ordered to rule out H pylori, PUD, celiac disease, hiatal hernia  -RUQ u/s ordered to rule out atypical presentation of GB etiology  -anti-gerd diet  -I explained to pt that I am happy the Zoloft helped his anxiety but if his work-up is WNL but continues to have symptoms, we can discuss switching to TCA  In the future if possible     ______________________________________________________________________    HPI:  Tasneem Venegas is a 27 y/o male with anxiety who presents for follow-up  He says that prior to being started on zoloft, he noticed that his anxiety and stress would trigger bouts of nausea and vomiting without blood, periumbilical abdominal pain, and either constipation or diarrhea  He says that since being on the zoloft, he has not had much issues with his bowel habits, unless he is anxious, but he still does have some nausea daily that occurs at random  He says the pain is also decrased now, but he still feels a "pressure" in that area  He says anti-reflux meds did not help with this  Pt says he is having increased anxiety over his ongoing dyspepsia, so he would like EGD done  Pt denies any other new meds or supplements, tobacco or illicit drug use, frequent alcohol use, NSAID use, unintentional weight loss, fevers, chills, bloody or black BMs  REVIEW OF SYSTEMS:    CONSTITUTIONAL: Denies any fever, chills, rigors, and weight loss  HEENT: No earache or tinnitus  Denies hearing loss or visual disturbances  CARDIOVASCULAR: No chest pain or palpitations  RESPIRATORY: Denies any cough, hemoptysis, shortness of breath or dyspnea on exertion  GASTROINTESTINAL: As noted in the History of Present Illness  GENITOURINARY: No problems with urination  Denies any hematuria or dysuria  NEUROLOGIC: No dizziness or vertigo, denies headaches  MUSCULOSKELETAL: Denies any muscle or joint pain  SKIN: Denies skin rashes or itching  ENDOCRINE: Denies excessive thirst  Denies intolerance to heat or cold  PSYCHOSOCIAL: Denies depression or anxiety  Denies any recent memory loss  Historical Information   History reviewed  No pertinent past medical history  History reviewed  No pertinent surgical history  Social History   Social History     Substance and Sexual Activity   Alcohol Use Never     Social History     Substance and Sexual Activity   Drug Use Never     Social History     Tobacco Use   Smoking Status Never   Smokeless Tobacco Never     History reviewed  No pertinent family history  Meds/Allergies       Current Outpatient Medications:   •  sertraline (Zoloft) 50 mg tablet  •  hydrOXYzine HCL (ATARAX) 25 mg tablet  •  omeprazole (PriLOSEC) 20 mg delayed release capsule    Allergies   Allergen Reactions   • Penicillins Rash and Other (See Comments)     He thinks it may be a rash           Objective     Blood pressure 125/82, pulse 77, temperature 98 6 °F (37 °C), temperature source Tympanic, height 6' (1 829 m), weight 79 4 kg (175 lb)  Body mass index is 23 73 kg/m²          PHYSICAL EXAM:      General Appearance:   Alert, cooperative, no distress   HEENT:   Normocephalic, atraumatic, anicteric      Neck:  Supple, symmetrical, trachea midline   Lungs:   Clear to auscultation bilaterally; no rales, rhonchi or wheezing; respirations unlabored  Heart[de-identified]   Regular rate and rhythm; no murmur, rub, or gallop  Abdomen:   Soft, non-tender, non-distended; normal bowel sounds; no masses, no organomegaly    Genitalia:   Deferred    Rectal:   Deferred    Extremities:  No cyanosis, clubbing or edema    Pulses:  2+ and symmetric    Skin:  No jaundice, rashes, or lesions    Lymph nodes:  No palpable cervical lymphadenopathy        Lab Results:   No visits with results within 1 Day(s) from this visit     Latest known visit with results is:   Appointment on 10/07/2021   Component Date Value   • TSH 3RD GENERATON 10/07/2021 1 360    • Cholesterol 10/07/2021 189    • Triglycerides 10/07/2021 96    • HDL, Direct 10/07/2021 42    • LDL Calculated 10/07/2021 128 (H)    • Sodium 10/07/2021 136    • Potassium 10/07/2021 4 1    • Chloride 10/07/2021 105    • CO2 10/07/2021 28    • ANION GAP 10/07/2021 3 (L)    • BUN 10/07/2021 15    • Creatinine 10/07/2021 0 94    • Glucose, Fasting 10/07/2021 83    • Calcium 10/07/2021 9 0    • AST 10/07/2021 16    • ALT 10/07/2021 33    • Alkaline Phosphatase 10/07/2021 47    • Total Protein 10/07/2021 7 1    • Albumin 10/07/2021 3 9    • Total Bilirubin 10/07/2021 0 52    • eGFR 10/07/2021 111    • WBC 10/07/2021 6 65    • RBC 10/07/2021 5 19    • Hemoglobin 10/07/2021 15 4    • Hematocrit 10/07/2021 47 7    • MCV 10/07/2021 92    • MCH 10/07/2021 29 7    • MCHC 10/07/2021 32 3    • RDW 10/07/2021 12 2    • MPV 10/07/2021 9 9    • Platelets 29/69/8681 231    • nRBC 10/07/2021 0    • Neutrophils Relative 10/07/2021 63    • Immat GRANS % 10/07/2021 0    • Lymphocytes Relative 10/07/2021 25    • Monocytes Relative 10/07/2021 8    • Eosinophils Relative 10/07/2021 3    • Basophils Relative 10/07/2021 1    • Neutrophils Absolute 10/07/2021 4 21    • Immature Grans Absolute 10/07/2021 0 01    • Lymphocytes Absolute 10/07/2021 1 65    • Monocytes Absolute 10/07/2021 0 53    • Eosinophils Absolute 10/07/2021 0 21    • Basophils Absolute 10/07/2021 0 04          Radiology Results:   No results found

## 2023-01-27 ENCOUNTER — HOSPITAL ENCOUNTER (OUTPATIENT)
Dept: RADIOLOGY | Facility: HOSPITAL | Age: 28
Discharge: HOME | End: 2023-01-27

## 2023-01-27 DIAGNOSIS — R10.9 ABDOMINAL PAIN, UNSPECIFIED ABDOMINAL LOCATION: ICD-10-CM

## 2023-03-02 ENCOUNTER — ANESTHESIA (OUTPATIENT)
Dept: GASTROENTEROLOGY | Facility: AMBULARY SURGERY CENTER | Age: 28
End: 2023-03-02

## 2023-03-02 ENCOUNTER — ANESTHESIA EVENT (OUTPATIENT)
Dept: GASTROENTEROLOGY | Facility: AMBULARY SURGERY CENTER | Age: 28
End: 2023-03-02

## 2023-03-02 ENCOUNTER — HOSPITAL ENCOUNTER (OUTPATIENT)
Dept: GASTROENTEROLOGY | Facility: AMBULARY SURGERY CENTER | Age: 28
Setting detail: OUTPATIENT SURGERY
End: 2023-03-02

## 2023-03-02 VITALS
RESPIRATION RATE: 21 BRPM | OXYGEN SATURATION: 100 % | HEART RATE: 52 BPM | BODY MASS INDEX: 23.72 KG/M2 | WEIGHT: 179 LBS | HEIGHT: 73 IN | TEMPERATURE: 98.3 F | SYSTOLIC BLOOD PRESSURE: 106 MMHG | DIASTOLIC BLOOD PRESSURE: 63 MMHG

## 2023-03-02 DIAGNOSIS — R11.10 VOMITING, UNSPECIFIED VOMITING TYPE, UNSPECIFIED WHETHER NAUSEA PRESENT: ICD-10-CM

## 2023-03-02 DIAGNOSIS — R10.9 ABDOMINAL PAIN, UNSPECIFIED ABDOMINAL LOCATION: ICD-10-CM

## 2023-03-02 RX ORDER — ONDANSETRON 2 MG/ML
4 INJECTION INTRAMUSCULAR; INTRAVENOUS ONCE AS NEEDED
Status: CANCELLED | OUTPATIENT
Start: 2023-03-02

## 2023-03-02 RX ORDER — PROPOFOL 10 MG/ML
INJECTION, EMULSION INTRAVENOUS CONTINUOUS PRN
Status: DISCONTINUED | OUTPATIENT
Start: 2023-03-02 | End: 2023-03-02

## 2023-03-02 RX ORDER — SODIUM CHLORIDE, SODIUM LACTATE, POTASSIUM CHLORIDE, CALCIUM CHLORIDE 600; 310; 30; 20 MG/100ML; MG/100ML; MG/100ML; MG/100ML
20 INJECTION, SOLUTION INTRAVENOUS CONTINUOUS
Status: CANCELLED | OUTPATIENT
Start: 2023-03-02

## 2023-03-02 RX ORDER — PROPOFOL 10 MG/ML
INJECTION, EMULSION INTRAVENOUS AS NEEDED
Status: DISCONTINUED | OUTPATIENT
Start: 2023-03-02 | End: 2023-03-02

## 2023-03-02 RX ORDER — SODIUM CHLORIDE, SODIUM LACTATE, POTASSIUM CHLORIDE, CALCIUM CHLORIDE 600; 310; 30; 20 MG/100ML; MG/100ML; MG/100ML; MG/100ML
125 INJECTION, SOLUTION INTRAVENOUS CONTINUOUS
Status: DISCONTINUED | OUTPATIENT
Start: 2023-03-02 | End: 2023-03-06 | Stop reason: HOSPADM

## 2023-03-02 RX ADMIN — PROPOFOL 150 MG: 10 INJECTION, EMULSION INTRAVENOUS at 15:12

## 2023-03-02 RX ADMIN — SODIUM CHLORIDE, SODIUM LACTATE, POTASSIUM CHLORIDE, AND CALCIUM CHLORIDE: .6; .31; .03; .02 INJECTION, SOLUTION INTRAVENOUS at 15:07

## 2023-03-02 RX ADMIN — PROPOFOL 10 MCG/KG/MIN: 10 INJECTION, EMULSION INTRAVENOUS at 15:12

## 2023-03-02 NOTE — ANESTHESIA POSTPROCEDURE EVALUATION
Post-Op Assessment Note    CV Status:  Stable  Pain Score: 0    Pain management: adequate     Mental Status:  Alert and awake   Hydration Status:  Stable   PONV Controlled:  None   Airway Patency:  Patent      Post Op Vitals Reviewed: Yes      Staff: CRNA         No notable events documented      /59 (03/02/23 1525)    Temp 98 3 °F (36 8 °C) (03/02/23 1525)    Pulse (!) 52 (03/02/23 1525)   Resp 20 (03/02/23 1525)    SpO2 96 % (03/02/23 1525)

## 2023-03-02 NOTE — ANESTHESIA PREPROCEDURE EVALUATION
Procedure:  EGD    Relevant Problems   CARDIO   (+) Hyperlipidemia      NEURO/PSYCH   (+) Anxiety        Physical Exam    Airway    Mallampati score: III  TM Distance: >3 FB  Neck ROM: full     Dental       Cardiovascular      Pulmonary      Other Findings        Anesthesia Plan  ASA Score- 2     Anesthesia Type- IV sedation with anesthesia with ASA Monitors  Additional Monitors:   Airway Plan:           Plan Factors-    Chart reviewed  Existing labs reviewed  Patient summary reviewed  Induction- intravenous  Postoperative Plan-     Informed Consent- Anesthetic plan and risks discussed with patient  I personally reviewed this patient with the CRNA  Discussed and agreed on the Anesthesia Plan with the CRNA  Mary Kay Womack

## 2023-03-02 NOTE — H&P
History and Physical -  [facility]  Philomena Malin 28 y o  male MRN: [id number]                  HPI: Philomena Malin is a 28y o  year old male who presents for egd      REVIEW OF SYSTEMS: Per the HPI, and otherwise unremarkable  Historical Information   Past Medical History:   Diagnosis Date   • Anxiety    • GERD (gastroesophageal reflux disease)      Past Surgical History:   Procedure Laterality Date   • FINGER SURGERY Right     ring finger     Social History   Social History     Substance and Sexual Activity   Alcohol Use Yes    Comment: 1 per week     Social History     Substance and Sexual Activity   Drug Use Never     Social History     Tobacco Use   Smoking Status Never   Smokeless Tobacco Never     History reviewed  No pertinent family history  Meds/Allergies       Current Outpatient Medications:   •  sertraline (Zoloft) 50 mg tablet    Current Facility-Administered Medications:   •  lactated ringers infusion, 125 mL/hr, Intravenous, Continuous    Allergies   Allergen Reactions   • Penicillins Rash and Other (See Comments)     He thinks it may be a rash       Objective     /76   Pulse (!) 47   Temp 98 5 °F (36 9 °C) (Temporal)   Resp 18   Ht 6' 1" (1 854 m)   Wt 81 2 kg (179 lb)   SpO2 100%   BMI 23 62 kg/m²       PHYSICAL EXAM    Gen: NAD  Head: NCAT  CV: RRR  CHEST: Clear  ABD: soft, NT/ND  EXT: no edema      ASSESSMENT/PLAN:  This is a 28y o  year old male here for egd, and he is stable and optimized for his procedure

## 2023-03-06 DIAGNOSIS — F41.9 ANXIETY: ICD-10-CM

## 2023-05-10 ENCOUNTER — OFFICE VISIT (OUTPATIENT)
Dept: GASTROENTEROLOGY | Facility: CLINIC | Age: 28
End: 2023-05-10

## 2023-05-10 VITALS
WEIGHT: 178.2 LBS | HEIGHT: 73 IN | SYSTOLIC BLOOD PRESSURE: 110 MMHG | BODY MASS INDEX: 23.62 KG/M2 | DIASTOLIC BLOOD PRESSURE: 70 MMHG | TEMPERATURE: 98 F

## 2023-05-10 DIAGNOSIS — R11.2 NAUSEA AND VOMITING, UNSPECIFIED VOMITING TYPE: Primary | ICD-10-CM

## 2023-05-10 DIAGNOSIS — F41.9 ANXIETY: ICD-10-CM

## 2023-05-10 RX ORDER — SERTRALINE HYDROCHLORIDE 100 MG/1
100 TABLET, FILM COATED ORAL DAILY
Qty: 30 TABLET | Refills: 5 | Status: SHIPPED | OUTPATIENT
Start: 2023-05-10

## 2023-05-10 NOTE — PROGRESS NOTES
[facility] - Outpatient Follow-up Note  Poly Check 28 y o  male MRN: [id number]  Encounter: [id number]          ASSESSMENT AND PLAN:      1  Nausea and vomiting  Situational   Reports only occurs when he is eating outside  No symptoms when he is eating at home  He is able to tolerate diet well overall except for the occurrence of the symptoms  He is worried as he has his wedding coming up in September and would like better control of the symptoms at this time  His symptoms do sound like generalized anxiety with intermittent panic attacks  I will send message to his primary care provider to consider increasing the dose of Zoloft or switching to TCA  Additionally can also consider as needed benzodiazepine  EGD was performed recently and was unremarkable including biopsies  There are no diagnoses linked to this encounter   ______________________________________________________________________    SUBJECTIVE:      27 yo M who presents for follow up  He reports chronic symptoms of post prandial nausea, vomiting, anxiety episodes usually when eating out  Ongoing since 2021 and has tried making dietary modifications along with alcohol avoidance  Since then he was started on sertraline for generalized anxiety, does report that it does help however lately his symptoms have been reoccurring  He typically describes his symptoms only to occur when he is eating outside, no issues with eating at home  Has been able to have a stable weight  Denies any change in bowel habits, no alarm signs  REVIEW OF SYSTEMS IS OTHERWISE NEGATIVE        Historical Information   Past Medical History:   Diagnosis Date   • Anxiety    • GERD (gastroesophageal reflux disease)      Past Surgical History:   Procedure Laterality Date   • FINGER SURGERY Right     ring finger     Social History   Social History     Substance and Sexual Activity   Alcohol Use Yes    Comment: 1 per week     Social History "    Substance and Sexual Activity   Drug Use Never     Social History     Tobacco Use   Smoking Status Never   Smokeless Tobacco Never     History reviewed  No pertinent family history  Meds/Allergies       Current Outpatient Medications:   •  sertraline (Zoloft) 50 mg tablet    Allergies   Allergen Reactions   • Penicillins Rash and Other (See Comments)     He thinks it may be a rash           Objective     Blood pressure 110/70, temperature 98 °F (36 7 °C), temperature source Tympanic, height 6' 1\" (1 854 m), weight 80 8 kg (178 lb 3 2 oz)  Body mass index is 23 51 kg/m²  PHYSICAL EXAM:      General Appearance:   Alert, cooperative, no distress   HEENT:   Normocephalic    Respi:   No labored breathing, able to speak in full sentences   Heart[de-identified]   Normal rate on pulse palpation   Abdomen:   Non tender   Genitalia:   Deferred    Rectal:   Deferred    Extremities:  No cyanosis, clubbing or edema    Pulses:  2+ and symmetric    Skin:  No jaundice   Lymph nodes:  No palpable cervical lymphadenopathy        Lab Results:   No visits with results within 1 Day(s) from this visit     Latest known visit with results is:   Hospital Outpatient Visit on 03/02/2023   Component Date Value   • Case Report 03/02/2023                      Value:Surgical Pathology Report                         Case: [id number]                                   Authorizing Provider:  Leila Wetzel MD         Collected:           03/02/2023 1515              Ordering Location:     [facility]        Received:            03/02/2023 2031                                     [facility]                                                    Pathologist:           Reba Dias MD                                                          Specimens:   A) - Duodenum, bx duodenum r/o celiac                                                               B) - Stomach, bx gastric r/o h pylori                                              " • Final Diagnosis 03/02/2023                      Value: This result contains rich text formatting which cannot be displayed here  • Additional Information 03/02/2023                      Value: This result contains rich text formatting which cannot be displayed here  • Gross Description 03/02/2023                      Value: This result contains rich text formatting which cannot be displayed here  • Clinical Information 03/02/2023                      Value:Cold biopsy         Radiology Results:   No results found  Ashia Daugherty MD  Fellow  Gastroenterology  [facility]      Answers for HPI/ROS submitted by the patient on 5/3/2023  Chronicity: recurrent  Onset: more than 1 month ago  Onset quality: gradual  Frequency: intermittently  Episode duration: 8 Hours  Pain location: periumbilical region  Pain - numeric: 8/10  Pain quality: aching, cramping  Radiates to: does not radiate  anorexia: Yes  arthralgias: No  belching: Yes  constipation: Yes  diarrhea: Yes  dysuria: No  fever: No  flatus: Yes  frequency: No  headaches: No  hematochezia: No  hematuria: No  melena: No  myalgias: No  nausea: Yes  weight loss: Yes  vomiting: Yes  Relieved by: movement, recumbency  Diagnostic workup: ultrasound, upper endoscopy    Conflicting answers have been found for some questions  Please document the patient's answers manually

## 2023-05-27 ENCOUNTER — HOSPITAL ENCOUNTER (EMERGENCY)
Facility: HOSPITAL | Age: 28
Discharge: HOME | End: 2023-05-27
Attending: EMERGENCY MEDICINE

## 2023-05-27 ENCOUNTER — APPOINTMENT (EMERGENCY)
Dept: CT IMAGING | Facility: HOSPITAL | Age: 28
End: 2023-05-27

## 2023-05-27 VITALS
WEIGHT: 180 LBS | HEART RATE: 70 BPM | DIASTOLIC BLOOD PRESSURE: 76 MMHG | TEMPERATURE: 97.4 F | RESPIRATION RATE: 18 BRPM | BODY MASS INDEX: 23.75 KG/M2 | SYSTOLIC BLOOD PRESSURE: 148 MMHG | OXYGEN SATURATION: 100 %

## 2023-05-27 DIAGNOSIS — R91.1 PULMONARY NODULE: ICD-10-CM

## 2023-05-27 DIAGNOSIS — F41.9 ANXIETY: ICD-10-CM

## 2023-05-27 DIAGNOSIS — R11.2 NAUSEA AND VOMITING, UNSPECIFIED VOMITING TYPE: Primary | ICD-10-CM

## 2023-05-27 DIAGNOSIS — E86.0 DEHYDRATION: ICD-10-CM

## 2023-05-27 LAB
ALBUMIN SERPL BCP-MCNC: 5.6 G/DL (ref 3.5–5)
ALP SERPL-CCNC: 48 U/L (ref 34–104)
ALT SERPL W P-5'-P-CCNC: 16 U/L (ref 7–52)
ANION GAP SERPL CALCULATED.3IONS-SCNC: 18 MMOL/L (ref 4–13)
AST SERPL W P-5'-P-CCNC: 18 U/L (ref 13–39)
BACTERIA UR QL AUTO: ABNORMAL /HPF
BASOPHILS # BLD MANUAL: 0 THOUSAND/UL (ref 0–0.1)
BASOPHILS NFR MAR MANUAL: 0 % (ref 0–1)
BILIRUB SERPL-MCNC: 1.02 MG/DL (ref 0.2–1)
BILIRUB UR QL STRIP: NEGATIVE
BUN SERPL-MCNC: 18 MG/DL (ref 5–25)
CALCIUM SERPL-MCNC: 11.2 MG/DL (ref 8.4–10.2)
CHLORIDE SERPL-SCNC: 102 MMOL/L (ref 96–108)
CLARITY UR: CLEAR
CO2 SERPL-SCNC: 20 MMOL/L (ref 21–32)
COLOR UR: YELLOW
CREAT SERPL-MCNC: 1.04 MG/DL (ref 0.6–1.3)
EOSINOPHIL # BLD MANUAL: 0 THOUSAND/UL (ref 0–0.4)
EOSINOPHIL NFR BLD MANUAL: 0 % (ref 0–6)
ERYTHROCYTE [DISTWIDTH] IN BLOOD BY AUTOMATED COUNT: 12.3 % (ref 11.6–15.1)
GFR SERPL CREATININE-BSD FRML MDRD: 97 ML/MIN/1.73SQ M
GLUCOSE SERPL-MCNC: 159 MG/DL (ref 65–140)
GLUCOSE UR STRIP-MCNC: NEGATIVE MG/DL
HCT VFR BLD AUTO: 52.3 % (ref 36.5–49.3)
HGB BLD-MCNC: 17.7 G/DL (ref 12–17)
HGB UR QL STRIP.AUTO: NEGATIVE
KETONES UR STRIP-MCNC: ABNORMAL MG/DL
LEUKOCYTE ESTERASE UR QL STRIP: NEGATIVE
LIPASE SERPL-CCNC: 10 U/L (ref 11–82)
LYMPHOCYTES # BLD AUTO: 0.63 THOUSAND/UL (ref 0.6–4.47)
LYMPHOCYTES # BLD AUTO: 3 % (ref 14–44)
MCH RBC QN AUTO: 30.1 PG (ref 26.8–34.3)
MCHC RBC AUTO-ENTMCNC: 33.8 G/DL (ref 31.4–37.4)
MCV RBC AUTO: 89 FL (ref 82–98)
MONOCYTES # BLD AUTO: 1.05 THOUSAND/UL (ref 0–1.22)
MONOCYTES NFR BLD: 5 % (ref 4–12)
MUCOUS THREADS UR QL AUTO: ABNORMAL
NEUTROPHILS # BLD MANUAL: 19.18 THOUSAND/UL (ref 1.85–7.62)
NEUTS BAND NFR BLD MANUAL: 4 % (ref 0–8)
NEUTS SEG NFR BLD AUTO: 87 % (ref 43–75)
NITRITE UR QL STRIP: NEGATIVE
NON-SQ EPI CELLS URNS QL MICRO: ABNORMAL /HPF
PH UR STRIP.AUTO: 8 [PH]
PLATELET # BLD AUTO: 290 THOUSANDS/UL (ref 149–390)
PLATELET BLD QL SMEAR: ADEQUATE
PMV BLD AUTO: 9.5 FL (ref 8.9–12.7)
POTASSIUM SERPL-SCNC: 3.9 MMOL/L (ref 3.5–5.3)
PROT SERPL-MCNC: 8.9 G/DL (ref 6.4–8.4)
PROT UR STRIP-MCNC: ABNORMAL MG/DL
RBC # BLD AUTO: 5.89 MILLION/UL (ref 3.88–5.62)
RBC #/AREA URNS AUTO: ABNORMAL /HPF
SODIUM SERPL-SCNC: 140 MMOL/L (ref 135–147)
SP GR UR STRIP.AUTO: 1.03 (ref 1–1.03)
UROBILINOGEN UR STRIP-ACNC: <2 MG/DL
VARIANT LYMPHS # BLD AUTO: 1 %
WBC # BLD AUTO: 21.08 THOUSAND/UL (ref 4.31–10.16)
WBC #/AREA URNS AUTO: ABNORMAL /HPF

## 2023-05-27 RX ORDER — ONDANSETRON 2 MG/ML
4 INJECTION INTRAMUSCULAR; INTRAVENOUS ONCE
Status: COMPLETED | OUTPATIENT
Start: 2023-05-27 | End: 2023-05-27

## 2023-05-27 RX ORDER — ONDANSETRON 4 MG/1
4 TABLET, FILM COATED ORAL EVERY 6 HOURS
Qty: 12 TABLET | Refills: 0 | Status: SHIPPED | OUTPATIENT
Start: 2023-05-27

## 2023-05-27 RX ADMIN — ONDANSETRON 4 MG: 2 INJECTION INTRAMUSCULAR; INTRAVENOUS at 18:51

## 2023-05-27 RX ADMIN — ONDANSETRON 4 MG: 2 INJECTION INTRAMUSCULAR; INTRAVENOUS at 20:36

## 2023-05-27 RX ADMIN — IOHEXOL 80 ML: 350 INJECTION, SOLUTION INTRAVENOUS at 19:40

## 2023-05-27 RX ADMIN — SODIUM CHLORIDE 1000 ML: 0.9 INJECTION, SOLUTION INTRAVENOUS at 20:15

## 2023-05-27 RX ADMIN — MORPHINE SULFATE 2 MG: 2 INJECTION, SOLUTION INTRAMUSCULAR; INTRAVENOUS at 18:51

## 2023-05-27 NOTE — ED PROVIDER NOTES
History  Chief Complaint   Patient presents with   • Vomiting     Nausea, vomiting, dizziness for months now; had endoscopy - normal; does smoke marijuana     HPI     29 y/o M with PMH of anxiety and recurrent N/V presents with acute onset abdominal pain with nausea, dizziness and >10 episodes of vomiting today  He reports around 9am this morning he started with heartburn which progressed to nausea, and within an hour he started having significant epigastric and periumbilical abdominal pain with vomiting of non-bloody, but bilious vomit  He reports >10 episodes of vomiting since then and when he presented to the ED around 4:30pm  His fiance brought him to the ED as this is the worst pain and vomiting she has ever seen him him, and it seemed worse than his passed episodes of nausea and vomiting  He is currently following with GI outpatient with normal EGD in 3/2023  His only medications he takes are Zoloft for anxiety  He drinks alcohol a couple drinks per week on average, socially  He smoked tobacco many years ago but quit  He currently uses marijuana daily, smoking  Currently he is in 10/10 abdominal pain and reporting chills  His wife reports he looked pale and sweating at home  He reports about 20lb weight loss in past year without trying  His last meal was 7pm last night with gnocchi  He denies recent dietary changes, appetite changes, urinary symptoms, bowel habit changes  Prior to Admission Medications   Prescriptions Last Dose Informant Patient Reported? Taking?   sertraline (Zoloft) 100 mg tablet 5/27/2023  No Yes   Sig: Take 1 tablet (100 mg total) by mouth daily      Facility-Administered Medications: None       Past Medical History:   Diagnosis Date   • Anxiety    • GERD (gastroesophageal reflux disease)        Past Surgical History:   Procedure Laterality Date   • FINGER SURGERY Right     ring finger       History reviewed  No pertinent family history    I have reviewed and agree with the history as documented  E-Cigarette/Vaping   • E-Cigarette Use Never User      E-Cigarette/Vaping Substances   • Nicotine No    • THC No    • CBD No      Social History     Tobacco Use   • Smoking status: Every Day     Types: Cigarettes   • Smokeless tobacco: Never   Vaping Use   • Vaping Use: Never used   Substance Use Topics   • Alcohol use: Yes     Comment: 1 per week   • Drug use: Yes     Types: Marijuana        Review of Systems   Constitutional: Positive for chills, diaphoresis and unexpected weight change  Negative for appetite change, fatigue and fever  Eyes: Negative for visual disturbance  Respiratory: Negative for cough, chest tightness and shortness of breath  Cardiovascular: Negative for chest pain and leg swelling  Gastrointestinal: Positive for abdominal pain, nausea and vomiting  Negative for abdominal distention, blood in stool, constipation and diarrhea  Genitourinary: Negative for dysuria, flank pain, frequency, hematuria and urgency  Skin: Negative for rash  Neurological: Positive for dizziness  Negative for headaches  Psychiatric/Behavioral: The patient is nervous/anxious  Physical Exam  ED Triage Vitals   Temperature Pulse Respirations Blood Pressure SpO2   05/27/23 1806 05/27/23 1703 05/27/23 1703 05/27/23 1703 05/27/23 1703   (!) 97 4 °F (36 3 °C) 71 18 154/84 100 %      Temp Source Heart Rate Source Patient Position - Orthostatic VS BP Location FiO2 (%)   05/27/23 1806 05/27/23 1806 05/27/23 1806 05/27/23 1806 --   Oral Monitor Lying Left arm       Pain Score       05/27/23 1703       4             Orthostatic Vital Signs  Vitals:    05/27/23 1703 05/27/23 1806 05/27/23 2037   BP: 154/84 142/76 148/76   Pulse: 71 68 70   Patient Position - Orthostatic VS:  Lying Sitting       Physical Exam  Vitals reviewed  Constitutional:       General: He is in acute distress  Appearance: He is normal weight  He is not diaphoretic        Comments: Patient hunched over, appears uncomfortable  HENT:      Head: Normocephalic and atraumatic  Right Ear: External ear normal       Left Ear: External ear normal       Nose: Nose normal       Mouth/Throat:      Mouth: Mucous membranes are dry  Pharynx: Oropharynx is clear  No oropharyngeal exudate or posterior oropharyngeal erythema  Eyes:      Extraocular Movements: Extraocular movements intact  Conjunctiva/sclera: Conjunctivae normal    Cardiovascular:      Rate and Rhythm: Normal rate and regular rhythm  Heart sounds: Normal heart sounds  Pulmonary:      Effort: Pulmonary effort is normal       Breath sounds: Normal breath sounds  No wheezing, rhonchi or rales  Abdominal:      General: Abdomen is flat  There is no distension  Palpations: Abdomen is soft  Tenderness: There is abdominal tenderness (epigastric, periumbilical)  There is no right CVA tenderness, left CVA tenderness, guarding or rebound  Musculoskeletal:      Cervical back: Neck supple  Right lower leg: No edema  Left lower leg: No edema  Skin:     General: Skin is warm and dry  Capillary Refill: Capillary refill takes 2 to 3 seconds  Coloration: Skin is pale  Neurological:      General: No focal deficit present  Mental Status: He is alert  Psychiatric:         Mood and Affect: Mood is anxious  ED Medications  Medications   sodium chloride 0 9 % bolus 1,000 mL (1,000 mL Intravenous New Bag 5/27/23 2015)   ondansetron (ZOFRAN) injection 4 mg (4 mg Intravenous Given 5/27/23 1851)   morphine injection 2 mg (2 mg Intravenous Given 5/27/23 1851)   iohexol (OMNIPAQUE) 350 MG/ML injection (SINGLE-DOSE) 80 mL (80 mL Intravenous Given 5/27/23 1940)   ondansetron (ZOFRAN) injection 4 mg (4 mg Intravenous Given 5/27/23 2036)       Diagnostic Studies  Results Reviewed     Procedure Component Value Units Date/Time    Hemoglobin A1C [[id number]] Collected: 05/27/23 1715    Lab Status:  In process Specimen: Blood from Arm, Left Updated: 05/27/23 1914    Urine Microscopic [[id number]]  (Abnormal) Collected: 05/27/23 1853    Lab Status: Final result Specimen: Urine, Clean Catch Updated: 05/27/23 1906     RBC, UA 1-2 /hpf      WBC, UA None Seen /hpf      Epithelial Cells Occasional /hpf      Bacteria, UA None Seen /hpf      MUCUS THREADS Moderate    UA w Reflex to Microscopic w Reflex to Culture [[id number]]  (Abnormal) Collected: 05/27/23 1853    Lab Status: Final result Specimen: Urine, Clean Catch Updated: 05/27/23 1902     Color, UA Yellow     Clarity, UA Clear     Specific Gravity, UA 1 035     pH, UA 8 0     Leukocytes, UA Negative     Nitrite, UA Negative     Protein, UA 70 (1+) mg/dl      Glucose, UA Negative mg/dl      Ketones, UA >=150 (4+) mg/dl      Urobilinogen, UA <2 0 mg/dl      Bilirubin, UA Negative     Occult Blood, UA Negative    CBC and differential [[id number]]  (Abnormal) Collected: 05/27/23 1715    Lab Status: Final result Specimen: Blood from Arm, Left Updated: 05/27/23 1816     WBC 21 08 Thousand/uL      RBC 5 89 Million/uL      Hemoglobin 17 7 g/dL      Hematocrit 52 3 %      MCV 89 fL      MCH 30 1 pg      MCHC 33 8 g/dL      RDW 12 3 %      MPV 9 5 fL      Platelets 201 Thousands/uL     Narrative: This is an appended report  These results have been appended to a previously verified report      Manual Differential(PHLEBS Do Not Order) [[id number]]  (Abnormal) Collected: 05/27/23 1715    Lab Status: Final result Specimen: Blood from Arm, Left Updated: 05/27/23 1816     Segmented % 87 %      Bands % 4 %      Lymphocytes % 3 %      Monocytes % 5 %      Eosinophils, % 0 %      Basophils % 0 %      Atypical Lymphocytes % 1 %      Absolute Neutrophils 19 18 Thousand/uL      Lymphocytes Absolute 0 63 Thousand/uL      Monocytes Absolute 1 05 Thousand/uL      Eosinophils Absolute 0 00 Thousand/uL      Basophils Absolute 0 00 Thousand/uL      Total Counted --     Platelet Estimate Adequate    Lipase [[id number]] (Abnormal) Collected: 05/27/23 1715    Lab Status: Final result Specimen: Blood from Arm, Left Updated: 05/27/23 1754     Lipase 10 u/L     Comprehensive metabolic panel [[id number]]  (Abnormal) Collected: 05/27/23 1715    Lab Status: Final result Specimen: Blood from Arm, Left Updated: 05/27/23 1754     Sodium 140 mmol/L      Potassium 3 9 mmol/L      Chloride 102 mmol/L      CO2 20 mmol/L      ANION GAP 18 mmol/L      BUN 18 mg/dL      Creatinine 1 04 mg/dL      Glucose 159 mg/dL      Calcium 11 2 mg/dL      AST 18 U/L      ALT 16 U/L      Alkaline Phosphatase 48 U/L      Total Protein 8 9 g/dL      Albumin 5 6 g/dL      Total Bilirubin 1 02 mg/dL      eGFR 97 ml/min/1 73sq m     Narrative:      [place] guidelines for Chronic Kidney Disease (CKD):   •  Stage 1 with normal or high GFR (GFR > 90 mL/min/1 73 square meters)  •  Stage 2 Mild CKD (GFR = 60-89 mL/min/1 73 square meters)  •  Stage 3A Moderate CKD (GFR = 45-59 mL/min/1 73 square meters)  •  Stage 3B Moderate CKD (GFR = 30-44 mL/min/1 73 square meters)  •  Stage 4 Severe CKD (GFR = 15-29 mL/min/1 73 square meters)  •  Stage 5 End Stage CKD (GFR <15 mL/min/1 73 square meters)  Note: GFR calculation is accurate only with a steady state creatinine                 CT abdomen pelvis with contrast   Final Result by Juana Corona MD (05/27 2008)      No acute intra-abdominal abnormality  No free air or free fluid  2 mm pleural-based left lower lobe pulmonary nodule  Based on current Fleischner Society 2017 Guidelines on incidental pulmonary nodule, no routine follow-up is needed if the patient is low risk  If the patient is high risk, optional follow-up chest CT    at 12 months can be considered                 Workstation performed: [id number]               Procedures  Procedures      ED Course  ED Course as of 05/27/23 2043   Sat May 27, 2023   1850 29 y/o M with PMH of anxiety and recurrent N/V presents with acute onset "abdominal pain with nausea and >10 episodes of vomiting today  Triage vitals unremarkable  Triage bloodowrk with WBC count 21 08, normal lipase, CMP with anion gap 18  Patient currently receiving IVF, NS  Will check CT abd/pelvis with IV contrast and give IV zofran for nausea and IV morphine for pain  Check UA   2019 Urinalysis with 4+ ketones  Suspect starvation ketosis  Patient now receiving second L IVF  Patient reports pain improved to 4/10 but he still has nausea  Will give another dose of IV zofran  Once IVF complete plan to discharge patient home with close outpatient follow up with PCP and GI  SBIRT 20yo+    Flowsheet Row Most Recent Value   Initial Alcohol Screen: US AUDIT-C     1  How often do you have a drink containing alcohol? 2 Filed at: 05/27/2023 1718   2  How many drinks containing alcohol do you have on a typical day you are drinking? 1 Filed at: 05/27/2023 1718   3a  Male UNDER 65: How often do you have five or more drinks on one occasion? 0 Filed at: 05/27/2023 1718   Audit-C Score 3 Filed at: 05/27/2023 1718   YAZAN: How many times in the past year have you    Used an illegal drug or used a prescription medication for non-medical reasons? Daily or Almost Daily Filed at: 05/27/2023 1718   DAST-10: In the past 12 months       1  Have you used drugs other than those required for medical reasons? 0 Filed at: 05/27/2023 1718   2  Do you use more than one drug at a time? 0 Filed at: 05/27/2023 1718   3  Have you had medical problems as a result of your drug use (e g , memory loss, hepatitis, convulsions, bleeding, etc )? 0 Filed at: 05/27/2023 1718   4  Have you had \"blackouts\" or \"flashbacks\" as a result of drug use? YesNo 0 Filed at: 05/27/2023 1718   5  Do you ever feel bad or guilty about your drug use? 0 Filed at: 05/27/2023 1718   6  Does your spouse (or parent) ever complain about your involvement with drugs? 0 Filed at: 05/27/2023 1718   7   Have you neglected your family because of " your use of drugs? 0 Filed at: 05/27/2023 1718   8  Have you engaged in illegal activities in order to obtain drugs? 0 Filed at: 05/27/2023 1718   9  Have you ever experienced withdrawal symptoms (felt sick) when you stopped taking drugs? 0 Filed at: 05/27/2023 1718   10  Are you always able to stop using drugs when you want to? 0 Filed at: 05/27/2023 1718   DAST-10 Score 0 Filed at: 05/27/2023 1718              Medical Decision Making  27 y/o M with PMH of anxiety and recurrent N/V presents with acute onset epigastric and periumbilical abdominal pain with nausea and >10 episodes of vomiting today  Triage vitals unremarkable  Triage bloodwork with WBC count 21 08, normal lipase, CMP with anion gap 18  Patient currently receiving IVF, NS  DDx: cholecystitis, appendicitis, gastritis, cannabis hyperemesis syndrome  IV zofran given for nausea, IV morphine given for pain  UA with 4+ ketones  Suspect starvation ketosis  Second L of IVF given  Patient reports pain improved to 4/10 but still nauseous  Gave another 4mg IV zofran  CT abd/pelvis unremarkable for acute findings  Patient improved, now stable for discharge home with close follow-up outpatient with PCP and GI  Will send prn zofran to pharmacy for patient  Encouraged patient to abstain from marijuana use  Encouraged patient to slowly advance diet back to regular diet from [place]  Dehydration: acute illness or injury  Nausea and vomiting, unspecified vomiting type: acute illness or injury  Amount and/or Complexity of Data Reviewed  Labs: ordered  Radiology: ordered  Risk  Prescription drug management              Disposition  Final diagnoses:   Pulmonary nodule   Nausea and vomiting, unspecified vomiting type   Dehydration     Time reflects when diagnosis was documented in both MDM as applicable and the Disposition within this note     Time User Action Codes Description Comment    5/27/2023  8:39 PM Shashi Call Add [R91 1] Pulmonary nodule 5/27/2023  8:41 PM Alla Epley Add [R11 2] Nausea and vomiting, unspecified vomiting type     5/27/2023  8:41 PM Alla Epley Add [E86 0] Dehydration     5/27/2023  8:41 PM Alla Epley Modify [R91 1] Pulmonary nodule     5/27/2023  8:41 PM Alla Epley Modify [R11 2] Nausea and vomiting, unspecified vomiting type       ED Disposition     ED Disposition   Discharge    Condition   Stable    Date/Time   Sat May 27, 2023  8:41 PM    Comment   [address] [place] [address] discharge to home/self care  Follow-up Information     Follow up With Specialties Details Why [facility] Schedule an appointment as soon as possible for a visit in 1 week  [address] [place] [address]            Patient's Medications   Discharge Prescriptions    ONDANSETRON (ZOFRAN) 4 MG TABLET    Take 1 tablet (4 mg total) by mouth every 6 (six) hours       Start Date: 5/27/2023 End Date: --       Order Dose: 4 mg       Quantity: 12 tablet    Refills: 0     No discharge procedures on file  PDMP Review     None           ED Provider  Attending physically available and evaluated [address] [place] [address] Lexie [place]  I managed the patient along with the ED Attending      Electronically Signed by         Leidy Unger DO  05/27/23 2044

## 2023-05-28 LAB
EST. AVERAGE GLUCOSE BLD GHB EST-MCNC: 108 MG/DL
HBA1C MFR BLD: 5.4 %

## 2023-05-28 NOTE — DISCHARGE INSTRUCTIONS
CT scan showed a 2 mm pleural-based left lower lobe pulmonary nodule  Based on current Fleischner Society 2017 Guidelines on incidental pulmonary nodule, no routine follow-up is needed if the patient is low risk  If the patient is high risk, optional follow-up chest CT at 12 months can be considered    Please discuss the above CT findings with PCP to determine need for follow up

## 2023-05-28 NOTE — ED ATTENDING ATTESTATION
5/27/2023  I, Wendy Dong MD, saw and evaluated the patient  I have discussed the patient with the resident/non-physician practitioner and agree with the resident's/non-physician practitioner's findings, Plan of Care, and MDM as documented in the resident's/non-physician practitioner's note, except where noted  All available labs and Radiology studies were reviewed  I was present for key portions of any procedure(s) performed by the resident/non-physician practitioner and I was immediately available to provide assistance  At this point I agree with the current assessment done in the Emergency Department  I have conducted an independent evaluation of this patient a history and physical is as follows: Nausea, vomiting  Leukocytosis, CT with no acute emergent pathology identified, patient symptomatically improved with Zofran, IV fluids  He does have some intermittent nausea and vomiting for which she has previously seen gastroenterology  No acute emergent pathology identified, patient feels better after ER management, eager for discharge home  Will follow-up as an outpatient with his PCP and gastroenterologist, strict prescription for Zofran given to patient  Results Reviewed     Procedure Component Value Units Date/Time    Hemoglobin A1C [[id number]] Collected: 05/27/23 1715    Lab Status:  In process Specimen: Blood from Arm, Left Updated: 05/27/23 1914    Urine Microscopic [[id number]]  (Abnormal) Collected: 05/27/23 1853    Lab Status: Final result Specimen: Urine, Clean Catch Updated: 05/27/23 1906     RBC, UA 1-2 /hpf      WBC, UA None Seen /hpf      Epithelial Cells Occasional /hpf      Bacteria, UA None Seen /hpf      MUCUS THREADS Moderate    UA w Reflex to Microscopic w Reflex to Culture [[id number]]  (Abnormal) Collected: 05/27/23 1853    Lab Status: Final result Specimen: Urine, Clean Catch Updated: 05/27/23 1902     Color, UA Yellow     Clarity, UA Clear     Specific [place], UA 1 035 pH, UA 8 0     Leukocytes, UA Negative     Nitrite, UA Negative     Protein, UA 70 (1+) mg/dl      Glucose, UA Negative mg/dl      Ketones, UA >=150 (4+) mg/dl      Urobilinogen, UA <2 0 mg/dl      Bilirubin, UA Negative     Occult Blood, UA Negative    CBC and differential [[id number]]  (Abnormal) Collected: 05/27/23 1715    Lab Status: Final result Specimen: Blood from Arm, Left Updated: 05/27/23 1816     WBC 21 08 Thousand/uL      RBC 5 89 Million/uL      Hemoglobin 17 7 g/dL      Hematocrit 52 3 %      MCV 89 fL      MCH 30 1 pg      MCHC 33 8 g/dL      RDW 12 3 %      MPV 9 5 fL      Platelets 416 Thousands/uL     Narrative: This is an appended report  These results have been appended to a previously verified report      Manual Differential(PHLEBS Do Not Order) [[id number]]  (Abnormal) Collected: 05/27/23 1715    Lab Status: Final result Specimen: Blood from Arm, Left Updated: 05/27/23 1816     Segmented % 87 %      Bands % 4 %      Lymphocytes % 3 %      Monocytes % 5 %      Eosinophils, % 0 %      Basophils % 0 %      Atypical Lymphocytes % 1 %      Absolute Neutrophils 19 18 Thousand/uL      Lymphocytes Absolute 0 63 Thousand/uL      Monocytes Absolute 1 05 Thousand/uL      Eosinophils Absolute 0 00 Thousand/uL      Basophils Absolute 0 00 Thousand/uL      Total Counted --     Platelet Estimate Adequate    Lipase [[id number]]  (Abnormal) Collected: 05/27/23 1715    Lab Status: Final result Specimen: Blood from Arm, Left Updated: 05/27/23 1754     Lipase 10 u/L     Comprehensive metabolic panel [[id number]]  (Abnormal) Collected: 05/27/23 1715    Lab Status: Final result Specimen: Blood from Arm, Left Updated: 05/27/23 1754     Sodium 140 mmol/L      Potassium 3 9 mmol/L      Chloride 102 mmol/L      CO2 20 mmol/L      ANION GAP 18 mmol/L      BUN 18 mg/dL      Creatinine 1 04 mg/dL      Glucose 159 mg/dL      Calcium 11 2 mg/dL      AST 18 U/L      ALT 16 U/L      Alkaline Phosphatase 48 U/L      Total Protein 8 9 g/dL      Albumin 5 6 g/dL      Total Bilirubin 1 02 mg/dL      eGFR 97 ml/min/1 73sq m     Narrative:      [place] guidelines for Chronic Kidney Disease (CKD):   •  Stage 1 with normal or high GFR (GFR > 90 mL/min/1 73 square meters)  •  Stage 2 Mild CKD (GFR = 60-89 mL/min/1 73 square meters)  •  Stage 3A Moderate CKD (GFR = 45-59 mL/min/1 73 square meters)  •  Stage 3B Moderate CKD (GFR = 30-44 mL/min/1 73 square meters)  •  Stage 4 Severe CKD (GFR = 15-29 mL/min/1 73 square meters)  •  Stage 5 End Stage CKD (GFR <15 mL/min/1 73 square meters)  Note: GFR calculation is accurate only with a steady state creatinine        CT abdomen pelvis with contrast   Final Result by Pavithra Graff MD (05/27 2008)      No acute intra-abdominal abnormality  No free air or free fluid  2 mm pleural-based left lower lobe pulmonary nodule  Based on current Fleischner Society 2017 Guidelines on incidental pulmonary nodule, no routine follow-up is needed if the patient is low risk  If the patient is high risk, optional follow-up chest CT    at 12 months can be considered                 Workstation performed: [id number]               ED Course         Critical Care Time  Procedures

## 2023-05-30 RX ORDER — SERTRALINE HYDROCHLORIDE 100 MG/1
100 TABLET, FILM COATED ORAL DAILY
Qty: 30 TABLET | Refills: 0 | Status: SHIPPED | OUTPATIENT
Start: 2023-05-30

## 2023-06-06 ENCOUNTER — OFFICE VISIT (OUTPATIENT)
Dept: FAMILY MEDICINE CLINIC | Facility: CLINIC | Age: 28
End: 2023-06-06
Payer: COMMERCIAL

## 2023-06-06 VITALS
TEMPERATURE: 97 F | HEIGHT: 72 IN | HEART RATE: 44 BPM | WEIGHT: 175 LBS | BODY MASS INDEX: 23.7 KG/M2 | OXYGEN SATURATION: 99 % | DIASTOLIC BLOOD PRESSURE: 82 MMHG | SYSTOLIC BLOOD PRESSURE: 122 MMHG

## 2023-06-06 DIAGNOSIS — K58.9 IRRITABLE BOWEL SYNDROME, UNSPECIFIED TYPE: ICD-10-CM

## 2023-06-06 DIAGNOSIS — T78.1XXA GASTROINTESTINAL FOOD SENSITIVITY: ICD-10-CM

## 2023-06-06 DIAGNOSIS — R11.2 NAUSEA AND VOMITING, UNSPECIFIED VOMITING TYPE: ICD-10-CM

## 2023-06-06 DIAGNOSIS — F41.9 ANXIETY: Primary | ICD-10-CM

## 2023-06-06 PROCEDURE — 99214 OFFICE O/P EST MOD 30 MIN: CPT | Performed by: FAMILY MEDICINE

## 2023-06-06 NOTE — PATIENT INSTRUCTIONS
Here for anxiety f-up and feeling better with zoloft 100 mg daily and is also feeling less anxious and would also like to see the allergist for possible food allergy  Gets stomach cramping with food  Likely IBS symptoms which has improved since starting zoloft 100 mg daily  Refrain from marijuana use of which this has helped  F-up with GI as well as directed  Stress management

## 2023-06-06 NOTE — PROGRESS NOTES
Name: Donald Lamas      : 1995      MRN: [id number]  Encounter Provider: Auston Canavan, DO  Encounter Date: 2023   Encounter department: [address] [place] [address]  Chief Complaint   Patient presents with   • Anxiety     Patient was seen in the ER for chest pain and vomiting from anxiety      Patient Instructions   Here for anxiety f-up and feeling better with zoloft 100 mg daily and is also feeling less anxious and would also like to see the allergist for possible food allergy  Gets stomach cramping with food  Likely IBS symptoms which has improved since starting zoloft 100 mg daily  Refrain from marijuana use of which this has helped  F-up with GI as well as directed  Assessment & Plan     1  Anxiety    2  Gastrointestinal food sensitivity    3  Irritable bowel syndrome, unspecified type    4  Nausea and vomiting, unspecified vomiting type           Subjective      Anxiety (Patient was seen in the ER for chest pain and vomiting from anxiety )  Patient feels better with the Zoloft 100 mg daily  Patient overall feels better and is a lot better overall and stomach is better  Possible food allergies and would like testing  Patient did see GI in past, they did rec zoloft increase  His nausea and vomiting have improved  Anxiety        Abdominal Pain  This is a recurrent problem  The current episode started more than 1 month ago  The onset quality is sudden  The problem occurs intermittently  The most recent episode lasted 3 hours  The problem has been unchanged  The pain is located in the periumbilical region  The pain is at a severity of 7/10  The quality of the pain is cramping  The abdominal pain radiates to the periumbilical region  Associated symptoms include anorexia, belching, constipation, flatus, vomiting and weight loss  Pertinent negatives include no arthralgias, diarrhea, dysuria, fever, frequency, headaches, hematochezia, hematuria, melena or myalgias   The pain is aggravated by certain positions, drinking alcohol and eating  The pain is relieved by movement, passing flatus and vomiting  Prior diagnostic workup includes GI consult, ultrasound and upper endoscopy  Review of Systems   Constitutional: Positive for weight loss  Negative for fever  HENT: Negative  Eyes: Negative  Respiratory: Negative  Cardiovascular: Negative  Gastrointestinal: Positive for abdominal pain, anorexia, constipation, flatus and vomiting  Negative for diarrhea, hematochezia and melena  Endocrine: Negative  Genitourinary: Negative for dysuria, frequency and hematuria  Musculoskeletal: Negative for arthralgias and myalgias  Skin: Negative  Allergic/Immunologic: Negative  Neurological: Negative for headaches  Hematological: Negative  Psychiatric/Behavioral: Negative  Current Outpatient Medications on File Prior to Visit   Medication Sig   • ondansetron (ZOFRAN) 4 mg tablet Take 1 tablet (4 mg total) by mouth every 6 (six) hours   • sertraline (Zoloft) 100 mg tablet Take 1 tablet (100 mg total) by mouth daily       Objective     /82 (BP Location: Left arm, Patient Position: Sitting, Cuff Size: Adult)   Pulse (!) 44   Temp (!) 97 °F (36 1 °C) (Temporal)   Ht 6' (1 829 m)   Wt 79 4 kg (175 lb)   SpO2 99%   BMI 23 73 kg/m²     Physical Exam  Constitutional:       Appearance: He is well-developed  HENT:      Head: Normocephalic and atraumatic  Mouth/Throat:      Mouth: Mucous membranes are moist    Eyes:      Conjunctiva/sclera: Conjunctivae normal       Pupils: Pupils are equal, round, and reactive to light  Cardiovascular:      Rate and Rhythm: Normal rate and regular rhythm  Pulses: Normal pulses  Heart sounds: Normal heart sounds  Pulmonary:      Effort: Pulmonary effort is normal       Breath sounds: Normal breath sounds  Musculoskeletal:         General: Normal range of motion        Cervical back: Normal range of motion and neck supple  Skin:     General: Skin is warm and dry  Capillary Refill: Capillary refill takes less than 2 seconds  Neurological:      General: No focal deficit present  Mental Status: He is alert and oriented to person, place, and time  Deep Tendon Reflexes: Reflexes are normal and symmetric  Psychiatric:         Mood and Affect: Mood normal          Behavior: Behavior normal          Thought Content:  Thought content normal          Judgment: Judgment normal        Cherelle Salcido, DO

## 2023-06-28 DIAGNOSIS — F41.9 ANXIETY: ICD-10-CM

## 2023-06-28 RX ORDER — SERTRALINE HYDROCHLORIDE 100 MG/1
TABLET, FILM COATED ORAL
Qty: 30 TABLET | Refills: 0 | Status: SHIPPED | OUTPATIENT
Start: 2023-06-28

## 2023-08-01 DIAGNOSIS — F41.9 ANXIETY: ICD-10-CM

## 2023-08-01 RX ORDER — SERTRALINE HYDROCHLORIDE 100 MG/1
100 TABLET, FILM COATED ORAL DAILY
Qty: 90 TABLET | Refills: 0 | Status: SHIPPED | OUTPATIENT
Start: 2023-08-01

## 2023-08-01 NOTE — TELEPHONE ENCOUNTER
----- Message from Merari Velasquez sent at 7/31/2023  6:34 PM EDT -----  Regarding: Order requests  Contact: [phone]  Hi Dr. Daisy Meadows,     I’m looking to change my prescription to a 90day supply and have refills available for my 100mg sertraline. I called on Saturday 7/29 for a refill request but was informed that they received nothing today. If that can be arranged I would greatly appreciate it. Thank you very much and have a great day.     Joe Alvarado

## 2023-09-14 ENCOUNTER — OFFICE VISIT (OUTPATIENT)
Dept: FAMILY MEDICINE CLINIC | Facility: CLINIC | Age: 28
End: 2023-09-14
Payer: COMMERCIAL

## 2023-09-14 VITALS
WEIGHT: 177.6 LBS | SYSTOLIC BLOOD PRESSURE: 120 MMHG | DIASTOLIC BLOOD PRESSURE: 64 MMHG | OXYGEN SATURATION: 98 % | HEART RATE: 50 BPM | BODY MASS INDEX: 24.06 KG/M2 | HEIGHT: 72 IN

## 2023-09-14 DIAGNOSIS — F41.9 ANXIETY: Primary | ICD-10-CM

## 2023-09-14 DIAGNOSIS — K58.9 IRRITABLE BOWEL SYNDROME, UNSPECIFIED TYPE: ICD-10-CM

## 2023-09-14 DIAGNOSIS — R10.9 ABDOMINAL CRAMPS: ICD-10-CM

## 2023-09-14 PROCEDURE — 99214 OFFICE O/P EST MOD 30 MIN: CPT | Performed by: FAMILY MEDICINE

## 2023-09-14 RX ORDER — ALPRAZOLAM 0.25 MG/1
0.25 TABLET ORAL DAILY PRN
Qty: 10 TABLET | Refills: 0 | Status: SHIPPED | OUTPATIENT
Start: 2023-09-14

## 2023-09-14 NOTE — PROGRESS NOTES
Name: Nicole Freeman      : 1995      MRN: [id number]  Encounter Provider: Ninfa Walker DO  Encounter Date: 2023   Encounter department: [address] [place] [address]  Chief Complaint   Patient presents with   • Follow-up     Here for a follow up of anxiety and medications. Patient Instructions   Continue same zoloft dose at 100 mg daily and rec for acute panic attacks or anxiety attacks may use Xanax prn anxiety attack. No etoh use or driving if on Xanax medication. Assessment & Plan     1. Anxiety  -     ALPRAZolam (XANAX) 0.25 mg tablet; Take 1 tablet (0.25 mg total) by mouth daily as needed for anxiety    2. Abdominal cramps    3. Irritable bowel syndrome, unspecified type           Subjective      Here for anxiety and abdominal cramps better with zoloft 100 mg dose. Patient would like medication for acute attacks of anxiety if needed. No blood in stool and no issues with diarrhea or constipation. Review of Systems   Constitutional: Negative. HENT: Negative. Eyes: Negative. Respiratory: Negative. Cardiovascular: Negative. Gastrointestinal:        Abdominal cramps on occasion and better after starting Zoloft for anxiety   Endocrine: Negative. Genitourinary: Negative. Musculoskeletal: Negative. Skin: Negative. Allergic/Immunologic: Negative. Neurological: Negative. Hematological: Negative. Psychiatric/Behavioral: Negative.         Current Outpatient Medications on File Prior to Visit   Medication Sig   • MAGNESIUM GLYCINATE PO Take by mouth   • sertraline (ZOLOFT) 100 mg tablet Take 1 tablet (100 mg total) by mouth daily   • [DISCONTINUED] ondansetron (ZOFRAN) 4 mg tablet Take 1 tablet (4 mg total) by mouth every 6 (six) hours (Patient not taking: Reported on 2023)       Objective     /64 (BP Location: Right arm, Patient Position: Sitting, Cuff Size: Standard)   Pulse (!) 50   Ht 6' (1.829 m)   Wt 80.6 kg (177 lb 9.6 oz) SpO2 98%   BMI 24.09 kg/m²     Physical Exam  Constitutional:       Appearance: He is well-developed. HENT:      Head: Normocephalic and atraumatic. Eyes:      Conjunctiva/sclera: Conjunctivae normal.      Pupils: Pupils are equal, round, and reactive to light. Cardiovascular:      Rate and Rhythm: Normal rate and regular rhythm. Pulses: Normal pulses. Heart sounds: Normal heart sounds. Pulmonary:      Effort: Pulmonary effort is normal.      Breath sounds: Normal breath sounds. Musculoskeletal:         General: Normal range of motion. Cervical back: Normal range of motion and neck supple. Skin:     General: Skin is warm and dry. Capillary Refill: Capillary refill takes less than 2 seconds. Neurological:      General: No focal deficit present. Mental Status: He is alert and oriented to person, place, and time. Mental status is at baseline. Deep Tendon Reflexes: Reflexes are normal and symmetric. Psychiatric:         Mood and Affect: Mood normal.         Behavior: Behavior normal.         Thought Content:  Thought content normal.         Judgment: Judgment normal.       Yue Siegel, DO

## 2023-09-14 NOTE — PATIENT INSTRUCTIONS
Continue same zoloft dose at 100 mg daily and rec for acute panic attacks or anxiety attacks may use Xanax prn anxiety attack. No etoh use or driving if on Xanax medication.

## 2023-10-30 DIAGNOSIS — F41.9 ANXIETY: ICD-10-CM

## 2023-10-30 RX ORDER — ALPRAZOLAM 0.25 MG/1
0.25 TABLET ORAL DAILY PRN
Qty: 10 TABLET | Refills: 0 | Status: SHIPPED | OUTPATIENT
Start: 2023-10-30

## 2023-10-30 RX ORDER — SERTRALINE HYDROCHLORIDE 100 MG/1
100 TABLET, FILM COATED ORAL DAILY
Qty: 90 TABLET | Refills: 0 | Status: SHIPPED | OUTPATIENT
Start: 2023-10-30

## 2024-01-18 ENCOUNTER — OFFICE VISIT (OUTPATIENT)
Dept: FAMILY MEDICINE CLINIC | Facility: CLINIC | Age: 29
End: 2024-01-18
Payer: COMMERCIAL

## 2024-01-18 VITALS
RESPIRATION RATE: 16 BRPM | WEIGHT: 192.2 LBS | SYSTOLIC BLOOD PRESSURE: 110 MMHG | DIASTOLIC BLOOD PRESSURE: 60 MMHG | OXYGEN SATURATION: 97 % | TEMPERATURE: 98.3 F | BODY MASS INDEX: 26.03 KG/M2 | HEIGHT: 72 IN | HEART RATE: 49 BPM

## 2024-01-18 DIAGNOSIS — F41.9 ANXIETY: Primary | ICD-10-CM

## 2024-01-18 DIAGNOSIS — K58.9 IRRITABLE BOWEL SYNDROME, UNSPECIFIED TYPE: ICD-10-CM

## 2024-01-18 DIAGNOSIS — E78.5 HYPERLIPIDEMIA, UNSPECIFIED HYPERLIPIDEMIA TYPE: ICD-10-CM

## 2024-01-18 DIAGNOSIS — Z00.00 HEALTH CARE MAINTENANCE: ICD-10-CM

## 2024-01-18 DIAGNOSIS — E66.3 OVERWEIGHT (BMI 25.0-29.9): ICD-10-CM

## 2024-01-18 PROCEDURE — 99214 OFFICE O/P EST MOD 30 MIN: CPT | Performed by: FAMILY MEDICINE

## 2024-01-18 RX ORDER — SERTRALINE HYDROCHLORIDE 100 MG/1
100 TABLET, FILM COATED ORAL DAILY
Qty: 90 TABLET | Refills: 2 | Status: SHIPPED | OUTPATIENT
Start: 2024-01-18

## 2024-01-18 NOTE — PATIENT INSTRUCTIONS
Here for anxiety and hx of ldl and overweight - lose weight as directed to help and recheck in 6 months with routine labs and general PE if due. IBS stable on zoloft as well. Refilled Zoloft today.

## 2024-01-18 NOTE — PROGRESS NOTES
Chief Complaint   Patient presents with    Anxiety     Pt is here for follow up for anxiety      Patient Instructions   Here for anxiety and hx of ldl and overweight - lose weight as directed to help and recheck in 6 months with routine labs and general PE if due. IBS stable on zoloft as well. Refilled Zoloft today.   Assessment/Plan:    No problem-specific Assessment & Plan notes found for this encounter.       Diagnoses and all orders for this visit:    Anxiety  -     Comprehensive metabolic panel; Future  -     CBC and differential; Future  -     sertraline (ZOLOFT) 100 mg tablet; Take 1 tablet (100 mg total) by mouth daily    Hyperlipidemia, unspecified hyperlipidemia type  -     Comprehensive metabolic panel; Future  -     Lipid Panel with Direct LDL reflex; Future    Overweight (BMI 25.0-29.9)  -     Comprehensive metabolic panel; Future  -     Lipid Panel with Direct LDL reflex; Future  -     Hemoglobin A1C; Future    Irritable bowel syndrome, unspecified type  -     Comprehensive metabolic panel; Future  -     CBC and differential; Future    Health care maintenance  -     Comprehensive metabolic panel; Future  -     CBC and differential; Future  -     Lipid Panel with Direct LDL reflex; Future  -     Hemoglobin A1C; Future          Subjective:      Patient ID: Joselo Velasquez is a 28 y.o. male.    Anxiety (Pt is here for follow up for anxiety ). Stress stable and his of elevated ldl and overweight. IBS improved with Zoloft.     Anxiety            The following portions of the patient's history were reviewed and updated as appropriate: allergies, current medications, past family history, past medical history, past social history, past surgical history, and problem list.    Review of Systems   Constitutional: Negative.    HENT: Negative.     Eyes: Negative.    Respiratory: Negative.     Cardiovascular: Negative.    Gastrointestinal: Negative.    Endocrine: Negative.    Genitourinary: Negative.     Musculoskeletal: Negative.    Skin: Negative.    Allergic/Immunologic: Negative.    Neurological: Negative.    Hematological: Negative.    Psychiatric/Behavioral: Negative.          Anxiety stable         Objective:      /60   Pulse (!) 49   Temp 98.3 °F (36.8 °C) (Temporal)   Resp 16   Ht 6' (1.829 m)   Wt 87.2 kg (192 lb 3.2 oz)   SpO2 97%   BMI 26.07 kg/m²          Physical Exam  Constitutional:       Appearance: He is well-developed.      Comments: overweight   HENT:      Head: Normocephalic and atraumatic.      Right Ear: External ear normal.      Left Ear: External ear normal.      Nose: Nose normal.   Eyes:      Conjunctiva/sclera: Conjunctivae normal.      Pupils: Pupils are equal, round, and reactive to light.   Cardiovascular:      Rate and Rhythm: Normal rate and regular rhythm.      Pulses: Normal pulses.      Heart sounds: Normal heart sounds.   Pulmonary:      Effort: Pulmonary effort is normal.      Breath sounds: Normal breath sounds.   Musculoskeletal:         General: Normal range of motion.      Cervical back: Normal range of motion and neck supple.   Skin:     General: Skin is warm and dry.      Capillary Refill: Capillary refill takes less than 2 seconds.   Neurological:      General: No focal deficit present.      Mental Status: He is alert and oriented to person, place, and time. Mental status is at baseline.      Deep Tendon Reflexes: Reflexes are normal and symmetric.   Psychiatric:         Mood and Affect: Mood normal.         Behavior: Behavior normal.         Thought Content: Thought content normal.         Judgment: Judgment normal.      Comments: Anxiety stable on med

## 2024-02-14 DIAGNOSIS — F41.9 ANXIETY: ICD-10-CM

## 2024-02-14 RX ORDER — ALPRAZOLAM 0.25 MG/1
0.25 TABLET ORAL DAILY PRN
Qty: 10 TABLET | Refills: 0 | Status: SHIPPED | OUTPATIENT
Start: 2024-02-14

## 2024-02-14 RX ORDER — SERTRALINE HYDROCHLORIDE 100 MG/1
100 TABLET, FILM COATED ORAL DAILY
Qty: 90 TABLET | Refills: 0 | Status: CANCELLED | OUTPATIENT
Start: 2024-02-14

## 2024-05-29 ENCOUNTER — PATIENT MESSAGE (OUTPATIENT)
Dept: FAMILY MEDICINE CLINIC | Facility: CLINIC | Age: 29
End: 2024-05-29

## 2024-05-29 DIAGNOSIS — F41.9 ANXIETY: ICD-10-CM

## 2024-05-29 RX ORDER — ALPRAZOLAM 0.25 MG/1
0.25 TABLET ORAL DAILY PRN
Qty: 10 TABLET | Refills: 0 | Status: SHIPPED | OUTPATIENT
Start: 2024-05-29 | End: 2024-06-03 | Stop reason: SDUPTHER

## 2024-05-30 RX ORDER — SERTRALINE HYDROCHLORIDE 100 MG/1
100 TABLET, FILM COATED ORAL DAILY
Qty: 90 TABLET | Refills: 2 | OUTPATIENT
Start: 2024-05-30

## 2024-06-03 DIAGNOSIS — F41.9 ANXIETY: ICD-10-CM

## 2024-06-03 RX ORDER — ALPRAZOLAM 0.25 MG/1
0.25 TABLET ORAL DAILY PRN
Qty: 10 TABLET | Refills: 0 | Status: SHIPPED | OUTPATIENT
Start: 2024-06-03

## 2024-07-30 ENCOUNTER — HOSPITAL ENCOUNTER (EMERGENCY)
Facility: HOSPITAL | Age: 29
Discharge: HOME | End: 2024-07-30
Attending: EMERGENCY MEDICINE
Payer: COMMERCIAL

## 2024-07-30 VITALS
TEMPERATURE: 98 F | OXYGEN SATURATION: 98 % | DIASTOLIC BLOOD PRESSURE: 78 MMHG | RESPIRATION RATE: 16 BRPM | HEART RATE: 76 BPM | SYSTOLIC BLOOD PRESSURE: 131 MMHG

## 2024-07-30 DIAGNOSIS — R11.2 NAUSEA AND VOMITING, UNSPECIFIED VOMITING TYPE: Primary | ICD-10-CM

## 2024-07-30 LAB
ALBUMIN SERPL BCG-MCNC: 4.9 G/DL (ref 3.5–5)
ALP SERPL-CCNC: 33 U/L (ref 34–104)
ALT SERPL W P-5'-P-CCNC: 13 U/L (ref 7–52)
ANION GAP SERPL CALCULATED.3IONS-SCNC: 16 MMOL/L (ref 4–13)
AST SERPL W P-5'-P-CCNC: 19 U/L (ref 13–39)
BASE EX.OXY STD BLDV CALC-SCNC: 72.2 % (ref 60–80)
BASE EXCESS BLDV CALC-SCNC: 1.1 MMOL/L
BASOPHILS # BLD AUTO: 0.02 THOUSANDS/ÂΜL (ref 0–0.1)
BASOPHILS NFR BLD AUTO: 0 % (ref 0–1)
BILIRUB SERPL-MCNC: 2.11 MG/DL (ref 0.2–1)
BUN SERPL-MCNC: 22 MG/DL (ref 5–25)
CALCIUM SERPL-MCNC: 10.3 MG/DL (ref 8.4–10.2)
CHLORIDE SERPL-SCNC: 90 MMOL/L (ref 96–108)
CO2 SERPL-SCNC: 24 MMOL/L (ref 21–32)
CREAT SERPL-MCNC: 0.94 MG/DL (ref 0.6–1.3)
EOSINOPHIL # BLD AUTO: 0 THOUSAND/ÂΜL (ref 0–0.61)
EOSINOPHIL NFR BLD AUTO: 0 % (ref 0–6)
ERYTHROCYTE [DISTWIDTH] IN BLOOD BY AUTOMATED COUNT: 12.3 % (ref 11.6–15.1)
GFR SERPL CREATININE-BSD FRML MDRD: 109 ML/MIN/1.73SQ M
GLUCOSE SERPL-MCNC: 89 MG/DL (ref 65–140)
HCO3 BLDV-SCNC: 23.4 MMOL/L (ref 24–30)
HCT VFR BLD AUTO: 48.7 % (ref 36.5–49.3)
HGB BLD-MCNC: 17.2 G/DL (ref 12–17)
IMM GRANULOCYTES # BLD AUTO: 0.03 THOUSAND/UL (ref 0–0.2)
IMM GRANULOCYTES NFR BLD AUTO: 0 % (ref 0–2)
LIPASE SERPL-CCNC: 35 U/L (ref 11–82)
LYMPHOCYTES # BLD AUTO: 1.69 THOUSANDS/ÂΜL (ref 0.6–4.47)
LYMPHOCYTES NFR BLD AUTO: 18 % (ref 14–44)
MCH RBC QN AUTO: 29.8 PG (ref 26.8–34.3)
MCHC RBC AUTO-ENTMCNC: 35.3 G/DL (ref 31.4–37.4)
MCV RBC AUTO: 84 FL (ref 82–98)
MONOCYTES # BLD AUTO: 0.81 THOUSAND/ÂΜL (ref 0.17–1.22)
MONOCYTES NFR BLD AUTO: 9 % (ref 4–12)
NEUTROPHILS # BLD AUTO: 6.91 THOUSANDS/ÂΜL (ref 1.85–7.62)
NEUTS SEG NFR BLD AUTO: 73 % (ref 43–75)
NRBC BLD AUTO-RTO: 0 /100 WBCS
O2 CT BLDV-SCNC: 16.7 ML/DL
PCO2 BLDV: 31.4 MM HG (ref 42–50)
PH BLDV: 7.49 [PH] (ref 7.3–7.4)
PLATELET # BLD AUTO: 313 THOUSANDS/UL (ref 149–390)
PMV BLD AUTO: 9.4 FL (ref 8.9–12.7)
PO2 BLDV: 36.6 MM HG (ref 35–45)
POTASSIUM SERPL-SCNC: 3.8 MMOL/L (ref 3.5–5.3)
PROT SERPL-MCNC: 8.1 G/DL (ref 6.4–8.4)
RBC # BLD AUTO: 5.78 MILLION/UL (ref 3.88–5.62)
SODIUM SERPL-SCNC: 130 MMOL/L (ref 135–147)
WBC # BLD AUTO: 9.46 THOUSAND/UL (ref 4.31–10.16)

## 2024-07-30 PROCEDURE — 96374 THER/PROPH/DIAG INJ IV PUSH: CPT

## 2024-07-30 PROCEDURE — 99284 EMERGENCY DEPT VISIT MOD MDM: CPT | Performed by: EMERGENCY MEDICINE

## 2024-07-30 PROCEDURE — 83690 ASSAY OF LIPASE: CPT | Performed by: EMERGENCY MEDICINE

## 2024-07-30 PROCEDURE — 96361 HYDRATE IV INFUSION ADD-ON: CPT

## 2024-07-30 PROCEDURE — 36415 COLL VENOUS BLD VENIPUNCTURE: CPT | Performed by: EMERGENCY MEDICINE

## 2024-07-30 PROCEDURE — 85025 COMPLETE CBC W/AUTO DIFF WBC: CPT | Performed by: EMERGENCY MEDICINE

## 2024-07-30 PROCEDURE — 99283 EMERGENCY DEPT VISIT LOW MDM: CPT

## 2024-07-30 PROCEDURE — 80053 COMPREHEN METABOLIC PANEL: CPT | Performed by: EMERGENCY MEDICINE

## 2024-07-30 PROCEDURE — 82805 BLOOD GASES W/O2 SATURATION: CPT | Performed by: EMERGENCY MEDICINE

## 2024-07-30 RX ORDER — DROPERIDOL 2.5 MG/ML
0.62 INJECTION, SOLUTION INTRAMUSCULAR; INTRAVENOUS ONCE
Status: COMPLETED | OUTPATIENT
Start: 2024-07-30 | End: 2024-07-30

## 2024-07-30 RX ORDER — ONDANSETRON 4 MG/1
8 TABLET, FILM COATED ORAL EVERY 8 HOURS PRN
Qty: 15 TABLET | Refills: 3 | Status: SHIPPED | OUTPATIENT
Start: 2024-07-30 | End: 2024-08-14

## 2024-07-30 RX ORDER — SODIUM CHLORIDE, SODIUM LACTATE, POTASSIUM CHLORIDE, CALCIUM CHLORIDE 600; 310; 30; 20 MG/100ML; MG/100ML; MG/100ML; MG/100ML
1000 INJECTION, SOLUTION INTRAVENOUS ONCE
Status: COMPLETED | OUTPATIENT
Start: 2024-07-30 | End: 2024-07-30

## 2024-07-30 RX ADMIN — DROPERIDOL 0.62 MG: 2.5 INJECTION, SOLUTION INTRAMUSCULAR; INTRAVENOUS at 09:58

## 2024-07-30 RX ADMIN — SODIUM CHLORIDE, SODIUM LACTATE, POTASSIUM CHLORIDE, AND CALCIUM CHLORIDE 1000 ML: .6; .31; .03; .02 INJECTION, SOLUTION INTRAVENOUS at 09:57

## 2024-07-30 NOTE — DISCHARGE INSTRUCTIONS
Diagnosis? Nausea/ vomiting- likely marijuana hyperemesis syndrome     - please start diet with frequent sips of liquids- advancing gradually to more solid foods over time    - for nausea/ vomiting: zofran 2 tablets dissolve in the mouth  every 6-8 hrs as needed    -  please return to  the er for any fevers- temp > 100.4/ any worsening/ intractable abdominal pain / any  intractable vomiting with the inability to keep anything down by mouth / any bloody/coffee ground vomitus or any bloody /black tarry stools

## 2024-09-11 ENCOUNTER — OFFICE VISIT (OUTPATIENT)
Dept: FAMILY MEDICINE CLINIC | Facility: CLINIC | Age: 29
End: 2024-09-11
Payer: COMMERCIAL

## 2024-09-11 VITALS
DIASTOLIC BLOOD PRESSURE: 78 MMHG | BODY MASS INDEX: 24.52 KG/M2 | TEMPERATURE: 98 F | HEART RATE: 68 BPM | HEIGHT: 72 IN | OXYGEN SATURATION: 98 % | WEIGHT: 181 LBS | SYSTOLIC BLOOD PRESSURE: 118 MMHG

## 2024-09-11 DIAGNOSIS — E78.5 HYPERLIPIDEMIA, UNSPECIFIED HYPERLIPIDEMIA TYPE: ICD-10-CM

## 2024-09-11 DIAGNOSIS — F41.9 ANXIETY: ICD-10-CM

## 2024-09-11 DIAGNOSIS — Z00.00 HEALTH CARE MAINTENANCE: Primary | ICD-10-CM

## 2024-09-11 DIAGNOSIS — K58.9 IRRITABLE BOWEL SYNDROME, UNSPECIFIED TYPE: ICD-10-CM

## 2024-09-11 PROCEDURE — 99395 PREV VISIT EST AGE 18-39: CPT | Performed by: FAMILY MEDICINE

## 2024-09-11 RX ORDER — SERTRALINE HYDROCHLORIDE 100 MG/1
100 TABLET, FILM COATED ORAL DAILY
Qty: 90 TABLET | Refills: 1 | Status: SHIPPED | OUTPATIENT
Start: 2024-09-11

## 2024-09-11 NOTE — PROGRESS NOTES
Ambulatory Visit  Name: Joselo Velasquez      : 1995      MRN: [id number]  Encounter Provider: Audrey Morfin DO  Encounter Date: 2024   Encounter department: [facility] PRIMARY CARE  Chief Complaint   Patient presents with    Annual Exam     Patient Instructions   Here for general PE and rec getting at least 8 hours sleep and also use sunscreen and monitor for ticks and also rec to eat healthy and exercise and take sertraline as directed and use Xanax prn anxiety. Low cholesterol diet encouraged.     Assessment & Plan  Health care maintenance         Anxiety         Hyperlipidemia, unspecified hyperlipidemia type         Irritable bowel syndrome, unspecified type         [unfilled]  History of Present Illness     Here for general PE and needs updated labs to be done. Patient plays basketball and active. Patient is an ,  and baby #1 on the way and expecting in 2025. Sleeps 7 hours of sleep per night. Sees dentist and does see eye doctor as well. Uses sunscreen and monitors for ticks. Anxiety stable on med and following low cholesterol diet.         History obtained from : patient  Review of Systems   Constitutional: Negative.    HENT: Negative.     Eyes: Negative.    Respiratory: Negative.     Cardiovascular: Negative.    Gastrointestinal: Negative.    Endocrine: Negative.    Genitourinary: Negative.    Musculoskeletal: Negative.    Skin: Negative.    Allergic/Immunologic: Negative.    Neurological: Negative.    Hematological: Negative.    Psychiatric/Behavioral: Negative.       Current Outpatient Medications on File Prior to Visit   Medication Sig Dispense Refill    ALPRAZolam (XANAX) 0.25 mg tablet Take 1 tablet (0.25 mg total) by mouth daily as needed for anxiety 10 tablet 0    Digestive Enzymes (Super Enzymes) TABS       MAGNESIUM GLYCINATE PO Take by mouth      [DISCONTINUED] sertraline (ZOLOFT) 100 mg tablet Take 1 tablet (100 mg total) by mouth  daily 90 tablet 2    ondansetron (Zofran) 4 mg tablet Take 2 tablets (8 mg total) by mouth every 8 (eight) hours as needed for nausea or vomiting for up to 15 days Please ignore previous- zofran odt 2 tablets dissolve in the mouth  every 8 hrs as needed for nausea/ vomtitng (Patient not taking: Reported on 9/11/2024) 15 tablet 3     No current facility-administered medications on file prior to visit.          Objective     /78   Pulse 68   Temp 98 °F (36.7 °C)   Ht 6' (1.829 m)   Wt 82.1 kg (181 lb)   SpO2 98%   BMI 24.55 kg/m²     Physical Exam  Constitutional:       Appearance: Normal appearance. He is well-developed.   HENT:      Head: Normocephalic and atraumatic.      Right Ear: Tympanic membrane, ear canal and external ear normal.      Left Ear: Tympanic membrane, ear canal and external ear normal.      Nose: Nose normal.      Mouth/Throat:      Mouth: Mucous membranes are moist.   Eyes:      Conjunctiva/sclera: Conjunctivae normal.      Pupils: Pupils are equal, round, and reactive to light.   Cardiovascular:      Rate and Rhythm: Normal rate and regular rhythm.      Pulses: Normal pulses.      Heart sounds: Normal heart sounds.   Pulmonary:      Effort: Pulmonary effort is normal.      Breath sounds: Normal breath sounds.   Abdominal:      General: Abdomen is flat. Bowel sounds are normal.      Palpations: Abdomen is soft.   Genitourinary:     Penis: Normal.       Testes: Normal.   Musculoskeletal:         General: Normal range of motion.      Cervical back: Normal range of motion and neck supple.   Skin:     General: Skin is warm and dry.      Capillary Refill: Capillary refill takes less than 2 seconds.   Neurological:      General: No focal deficit present.      Mental Status: He is alert and oriented to person, place, and time. Mental status is at baseline.      Deep Tendon Reflexes: Reflexes are normal and symmetric.   Psychiatric:         Mood and Affect: Mood normal.         Behavior:  Behavior normal.         Thought Content: Thought content normal.         Judgment: Judgment normal.       Administrative Statements   I have spent a total time of 30 minutes in caring for this patient on the day of the visit/encounter including Diagnostic results, Prognosis, Risks and benefits of tx options, Instructions for management, Patient and family education, Importance of tx compliance, Risk factor reductions, Impressions, Counseling / Coordination of care, Documenting in the medical record, Reviewing / ordering tests, medicine, procedures  , and Obtaining or reviewing history  .

## 2024-09-11 NOTE — PATIENT INSTRUCTIONS
Here for general PE and rec getting at least 8 hours sleep and also use sunscreen and monitor for ticks and also rec to eat healthy and exercise and take sertraline as directed and use Xanax prn anxiety. Low cholesterol diet encouraged.

## 2024-10-28 ENCOUNTER — IMMUNIZATIONS (OUTPATIENT)
Dept: FAMILY MEDICINE CLINIC | Facility: CLINIC | Age: 29
End: 2024-10-28
Payer: COMMERCIAL

## 2024-10-28 DIAGNOSIS — Z23 ENCOUNTER FOR IMMUNIZATION: Primary | ICD-10-CM

## 2024-10-28 DIAGNOSIS — Z23 NEED FOR COVID-19 VACCINE: ICD-10-CM

## 2024-10-28 PROCEDURE — 91320 SARSCV2 VAC 30MCG TRS-SUC IM: CPT

## 2024-10-28 PROCEDURE — 90472 IMMUNIZATION ADMIN EACH ADD: CPT

## 2024-10-28 PROCEDURE — 90471 IMMUNIZATION ADMIN: CPT

## 2024-10-28 PROCEDURE — 90656 IIV3 VACC NO PRSV 0.5 ML IM: CPT

## 2024-10-28 PROCEDURE — 90715 TDAP VACCINE 7 YRS/> IM: CPT

## 2024-10-28 PROCEDURE — 90480 ADMN SARSCOV2 VAC 1/ONLY CMP: CPT

## 2024-12-19 DIAGNOSIS — F41.9 ANXIETY: ICD-10-CM

## 2024-12-19 RX ORDER — SERTRALINE HYDROCHLORIDE 100 MG/1
100 TABLET, FILM COATED ORAL DAILY
Qty: 90 TABLET | Refills: 1 | Status: SHIPPED | OUTPATIENT
Start: 2024-12-19

## 2024-12-31 ENCOUNTER — RESULTS FOLLOW-UP (OUTPATIENT)
Dept: FAMILY MEDICINE CLINIC | Facility: CLINIC | Age: 29
End: 2024-12-31

## 2024-12-31 ENCOUNTER — APPOINTMENT (OUTPATIENT)
Dept: LAB | Facility: CLINIC | Age: 29
End: 2024-12-31
Payer: COMMERCIAL

## 2024-12-31 DIAGNOSIS — K58.9 IRRITABLE BOWEL SYNDROME, UNSPECIFIED TYPE: ICD-10-CM

## 2024-12-31 DIAGNOSIS — E66.3 OVERWEIGHT (BMI 25.0-29.9): ICD-10-CM

## 2024-12-31 DIAGNOSIS — F41.9 ANXIETY: ICD-10-CM

## 2024-12-31 DIAGNOSIS — Z00.00 HEALTH CARE MAINTENANCE: ICD-10-CM

## 2024-12-31 DIAGNOSIS — E78.5 HYPERLIPIDEMIA, UNSPECIFIED HYPERLIPIDEMIA TYPE: ICD-10-CM

## 2024-12-31 LAB
ALBUMIN SERPL BCG-MCNC: 4.3 G/DL (ref 3.5–5)
ALP SERPL-CCNC: 45 U/L (ref 34–104)
ALT SERPL W P-5'-P-CCNC: 41 U/L (ref 7–52)
ANION GAP SERPL CALCULATED.3IONS-SCNC: 5 MMOL/L (ref 4–13)
AST SERPL W P-5'-P-CCNC: 22 U/L (ref 13–39)
BASOPHILS # BLD AUTO: 0.04 THOUSANDS/ΜL (ref 0–0.1)
BASOPHILS NFR BLD AUTO: 1 % (ref 0–1)
BILIRUB SERPL-MCNC: 0.44 MG/DL (ref 0.2–1)
BUN SERPL-MCNC: 15 MG/DL (ref 5–25)
CALCIUM SERPL-MCNC: 9.5 MG/DL (ref 8.4–10.2)
CHLORIDE SERPL-SCNC: 103 MMOL/L (ref 96–108)
CHOLEST SERPL-MCNC: 242 MG/DL (ref ?–200)
CO2 SERPL-SCNC: 32 MMOL/L (ref 21–32)
CREAT SERPL-MCNC: 0.89 MG/DL (ref 0.6–1.3)
EOSINOPHIL # BLD AUTO: 0.17 THOUSAND/ΜL (ref 0–0.61)
EOSINOPHIL NFR BLD AUTO: 2 % (ref 0–6)
ERYTHROCYTE [DISTWIDTH] IN BLOOD BY AUTOMATED COUNT: 13 % (ref 11.6–15.1)
EST. AVERAGE GLUCOSE BLD GHB EST-MCNC: 117 MG/DL
GFR SERPL CREATININE-BSD FRML MDRD: 114 ML/MIN/1.73SQ M
GLUCOSE P FAST SERPL-MCNC: 89 MG/DL (ref 65–99)
HBA1C MFR BLD: 5.7 %
HCT VFR BLD AUTO: 47 % (ref 36.5–49.3)
HDLC SERPL-MCNC: 55 MG/DL
HGB BLD-MCNC: 15.2 G/DL (ref 12–17)
IMM GRANULOCYTES # BLD AUTO: 0.04 THOUSAND/UL (ref 0–0.2)
IMM GRANULOCYTES NFR BLD AUTO: 1 % (ref 0–2)
LDLC SERPL CALC-MCNC: 161 MG/DL (ref 0–100)
LYMPHOCYTES # BLD AUTO: 1.5 THOUSANDS/ΜL (ref 0.6–4.47)
LYMPHOCYTES NFR BLD AUTO: 19 % (ref 14–44)
MCH RBC QN AUTO: 29 PG (ref 26.8–34.3)
MCHC RBC AUTO-ENTMCNC: 32.3 G/DL (ref 31.4–37.4)
MCV RBC AUTO: 90 FL (ref 82–98)
MONOCYTES # BLD AUTO: 0.54 THOUSAND/ΜL (ref 0.17–1.22)
MONOCYTES NFR BLD AUTO: 7 % (ref 4–12)
NEUTROPHILS # BLD AUTO: 5.59 THOUSANDS/ΜL (ref 1.85–7.62)
NEUTS SEG NFR BLD AUTO: 70 % (ref 43–75)
NRBC BLD AUTO-RTO: 0 /100 WBCS
PLATELET # BLD AUTO: 252 THOUSANDS/UL (ref 149–390)
PMV BLD AUTO: 9.2 FL (ref 8.9–12.7)
POTASSIUM SERPL-SCNC: 4.4 MMOL/L (ref 3.5–5.3)
PROT SERPL-MCNC: 7.2 G/DL (ref 6.4–8.4)
RBC # BLD AUTO: 5.24 MILLION/UL (ref 3.88–5.62)
SODIUM SERPL-SCNC: 140 MMOL/L (ref 135–147)
TRIGL SERPL-MCNC: 128 MG/DL (ref ?–150)
WBC # BLD AUTO: 7.88 THOUSAND/UL (ref 4.31–10.16)

## 2024-12-31 PROCEDURE — 36415 COLL VENOUS BLD VENIPUNCTURE: CPT

## 2024-12-31 PROCEDURE — 80061 LIPID PANEL: CPT

## 2024-12-31 PROCEDURE — 83036 HEMOGLOBIN GLYCOSYLATED A1C: CPT

## 2024-12-31 PROCEDURE — 85025 COMPLETE CBC W/AUTO DIFF WBC: CPT

## 2024-12-31 PROCEDURE — 80053 COMPREHEN METABOLIC PANEL: CPT

## 2025-03-11 ENCOUNTER — OFFICE VISIT (OUTPATIENT)
Dept: FAMILY MEDICINE CLINIC | Facility: CLINIC | Age: 30
End: 2025-03-11
Payer: COMMERCIAL

## 2025-03-11 VITALS
WEIGHT: 243 LBS | TEMPERATURE: 96.5 F | HEIGHT: 72 IN | OXYGEN SATURATION: 97 % | HEART RATE: 89 BPM | SYSTOLIC BLOOD PRESSURE: 110 MMHG | DIASTOLIC BLOOD PRESSURE: 60 MMHG | RESPIRATION RATE: 16 BRPM | BODY MASS INDEX: 32.91 KG/M2

## 2025-03-11 DIAGNOSIS — F41.9 ANXIETY: Primary | ICD-10-CM

## 2025-03-11 DIAGNOSIS — R73.03 PREDIABETES: ICD-10-CM

## 2025-03-11 DIAGNOSIS — Z00.00 HEALTH CARE MAINTENANCE: ICD-10-CM

## 2025-03-11 DIAGNOSIS — E66.9 OBESITY (BMI 30-39.9): ICD-10-CM

## 2025-03-11 DIAGNOSIS — E78.5 HYPERLIPIDEMIA, UNSPECIFIED HYPERLIPIDEMIA TYPE: ICD-10-CM

## 2025-03-11 PROCEDURE — 99214 OFFICE O/P EST MOD 30 MIN: CPT | Performed by: FAMILY MEDICINE

## 2025-03-11 NOTE — PROGRESS NOTES
Name: Joselo Velasquez      : 1995      MRN: [id number]  Encounter Provider: Audrey Morfin DO  Encounter Date: 3/11/2025   Encounter department: [facility] PRIMARY CARE  Chief Complaint   Patient presents with    Follow-up     6 month follow up for medication review      Patient Instructions   Here for recheck on anxiety and prediabetes and high cholesterol. Recheck in 6 months with labs. Call if any problems. Continue zoloft and recheck 6 months with labs.     Assessment & Plan  Anxiety  Stable on zoloft  Orders:    Comprehensive metabolic panel; Future    CBC and differential; Future    Hyperlipidemia, unspecified hyperlipidemia type  Low cholesterol diet encouraged.   Orders:    Comprehensive metabolic panel; Future    Lipid Panel with Direct LDL reflex; Future    Prediabetes  Low sugar diet  Orders:    Comprehensive metabolic panel; Future    Hemoglobin A1C; Future    Obesity (BMI 30-39.9)  Lose weight as directed to get BMI lower than 25    Orders:    Comprehensive metabolic panel; Future    Hemoglobin A1C; Future    Lipid Panel with Direct LDL reflex; Future    Health care maintenance  Check labs in 6 months  Orders:    Comprehensive metabolic panel; Future    Hemoglobin A1C; Future    Lipid Panel with Direct LDL reflex; Future    CBC and differential; Future      Depression Screening and Follow-up Plan: Patient was screened for depression during today's encounter. They screened negative with a PHQ-2 score of 0.      Tobacco Cessation Counseling: Tobacco cessation counseling was provided. The patient is sincerely urged to quit consumption of tobacco. He is ready to quit tobacco. Quit tobacco        History of Present Illness     Here for recheck and taking zoloft and is stable.  No other complaints.      Review of Systems   Constitutional: Negative.    HENT: Negative.     Eyes: Negative.    Respiratory: Negative.     Cardiovascular: Negative.    Gastrointestinal: Negative.     Endocrine: Negative.    Genitourinary: Negative.    Musculoskeletal: Negative.    Skin: Negative.    Allergic/Immunologic: Negative.    Neurological: Negative.    Hematological: Negative.    Psychiatric/Behavioral: Negative.       Past Medical History:   Diagnosis Date    Anxiety     GERD (gastroesophageal reflux disease)      Past Surgical History:   Procedure Laterality Date    FINGER SURGERY Right     ring finger     History reviewed. No pertinent family history.  Social History     Tobacco Use    Smoking status: Every Day     Types: Cigarettes    Smokeless tobacco: Never   Vaping Use    Vaping status: Never Used   Substance and Sexual Activity    Alcohol use: Yes     Comment: 1 per week    Drug use: Yes     Types: Marijuana    Sexual activity: Not on file     Current Outpatient Medications on File Prior to Visit   Medication Sig    ALPRAZolam (XANAX) 0.25 mg tablet Take 1 tablet (0.25 mg total) by mouth daily as needed for anxiety    Digestive Enzymes (Super Enzymes) TABS     MAGNESIUM GLYCINATE PO Take by mouth    sertraline (ZOLOFT) 100 mg tablet Take 1 tablet (100 mg total) by mouth daily    [DISCONTINUED] ondansetron (Zofran) 4 mg tablet Take 2 tablets (8 mg total) by mouth every 8 (eight) hours as needed for nausea or vomiting for up to 15 days Please ignore previous- zofran odt 2 tablets dissolve in the mouth  every 8 hrs as needed for nausea/ vomtitng (Patient not taking: Reported on 9/11/2024)     Allergies   Allergen Reactions    Penicillins Rash and Other (See Comments)     He thinks it may be a rash     Immunization History   Administered Date(s) Administered    COVID-19 MODERNA VACC 0.25 ML IM BOOSTER 12/14/2021    COVID-19 MODERNA VACC 0.5 ML IM 01/09/2021, 02/04/2021    COVID-19 Pfizer mRNA vacc PF barak-sucrose 12 yr and older (Comirnaty) 10/28/2024    DTP 05/08/1995, 07/07/1995    DTP / HiB 02/10/1995, 03/26/1996    DTaP 05/19/2000    Hepatitis B 1995, 02/10/1995, 07/07/1995    Hib (PRP-T)  05/08/1995, 07/07/1995    INFLUENZA 05/24/2021, 11/07/2021    IPV 05/19/2000    Influenza, seasonal, injectable 01/04/2015    Influenza, seasonal, injectable, preservative free 10/28/2024    MMR 03/26/1996, 05/19/2000    Meningococcal 07/11/2008, 09/05/2013    Meningococcal MCV4, Unspecified 07/11/2008    OPV 02/10/1995, 05/08/1995, 07/07/1995, 03/26/1996    Tdap 09/27/2006, 08/01/2011, 10/28/2024    Tuberculin Skin Test-PPD Intradermal 01/04/2015    Varicella 09/27/2006, 07/06/2010     Objective   /60   Pulse 89   Temp (!) 96.5 °F (35.8 °C) (Temporal)   Resp 16   Ht 6' (1.829 m)   Wt 110 kg (243 lb)   SpO2 97%   BMI 32.96 kg/m²     Physical Exam  Constitutional:       Appearance: He is well-developed.   HENT:      Head: Normocephalic and atraumatic.      Right Ear: External ear normal.      Left Ear: External ear normal.      Nose: Nose normal.      Mouth/Throat:      Mouth: Mucous membranes are moist.   Eyes:      Conjunctiva/sclera: Conjunctivae normal.      Pupils: Pupils are equal, round, and reactive to light.   Cardiovascular:      Rate and Rhythm: Normal rate and regular rhythm.      Pulses: Normal pulses.      Heart sounds: Normal heart sounds.   Pulmonary:      Effort: Pulmonary effort is normal.      Breath sounds: Normal breath sounds.   Musculoskeletal:         General: Normal range of motion.      Cervical back: Normal range of motion and neck supple.   Skin:     General: Skin is warm and dry.      Capillary Refill: Capillary refill takes less than 2 seconds.   Neurological:      General: No focal deficit present.      Mental Status: He is alert and oriented to person, place, and time. Mental status is at baseline.      Deep Tendon Reflexes: Reflexes are normal and symmetric.   Psychiatric:         Mood and Affect: Mood normal.         Behavior: Behavior normal.         Thought Content: Thought content normal.         Judgment: Judgment normal.       Administrative Statements   I have  spent a total time of 30 minutes in caring for this patient on the day of the visit/encounter including Diagnostic results, Prognosis, Risks and benefits of tx options, Instructions for management, Patient and family education, Importance of tx compliance, Risk factor reductions, Impressions, Counseling / Coordination of care, Documenting in the medical record, Reviewing/placing orders in the medical record (including tests, medications, and/or procedures), and Obtaining or reviewing history  .

## 2025-03-11 NOTE — PATIENT INSTRUCTIONS
Here for recheck on anxiety and prediabetes and high cholesterol. Recheck in 6 months with labs. Call if any problems. Continue zoloft and recheck 6 months with labs.

## 2025-03-11 NOTE — ASSESSMENT & PLAN NOTE
Lose weight as directed to get BMI lower than 25    Orders:    Comprehensive metabolic panel; Future    Hemoglobin A1C; Future    Lipid Panel with Direct LDL reflex; Future

## 2025-03-11 NOTE — ASSESSMENT & PLAN NOTE
Low cholesterol diet encouraged.   Orders:    Comprehensive metabolic panel; Future    Lipid Panel with Direct LDL reflex; Future

## 2025-03-11 NOTE — ASSESSMENT & PLAN NOTE
Stable on zoloft  Orders:    Comprehensive metabolic panel; Future    CBC and differential; Future

## 2025-03-30 DIAGNOSIS — F41.9 ANXIETY: ICD-10-CM

## 2025-03-30 RX ORDER — SERTRALINE HYDROCHLORIDE 100 MG/1
100 TABLET, FILM COATED ORAL DAILY
Qty: 90 TABLET | Refills: 1 | Status: SHIPPED | OUTPATIENT
Start: 2025-03-30